# Patient Record
Sex: MALE | Race: WHITE | Employment: FULL TIME | URBAN - METROPOLITAN AREA
[De-identification: names, ages, dates, MRNs, and addresses within clinical notes are randomized per-mention and may not be internally consistent; named-entity substitution may affect disease eponyms.]

---

## 2019-07-02 ENCOUNTER — OFFICE VISIT (OUTPATIENT)
Dept: FAMILY MEDICINE CLINIC | Age: 54
End: 2019-07-02
Payer: COMMERCIAL

## 2019-07-02 VITALS
HEIGHT: 69 IN | BODY MASS INDEX: 42.51 KG/M2 | SYSTOLIC BLOOD PRESSURE: 134 MMHG | HEART RATE: 89 BPM | WEIGHT: 287 LBS | TEMPERATURE: 98.3 F | RESPIRATION RATE: 17 BRPM | DIASTOLIC BLOOD PRESSURE: 86 MMHG

## 2019-07-02 DIAGNOSIS — K21.9 GASTROESOPHAGEAL REFLUX DISEASE, ESOPHAGITIS PRESENCE NOT SPECIFIED: ICD-10-CM

## 2019-07-02 DIAGNOSIS — C43.9 MELANOMA OF SKIN (HCC): ICD-10-CM

## 2019-07-02 DIAGNOSIS — I10 ESSENTIAL HYPERTENSION: Primary | ICD-10-CM

## 2019-07-02 DIAGNOSIS — E78.2 MIXED HYPERLIPIDEMIA: ICD-10-CM

## 2019-07-02 PROCEDURE — 99203 OFFICE O/P NEW LOW 30 MIN: CPT | Performed by: NURSE PRACTITIONER

## 2019-07-02 RX ORDER — LOSARTAN POTASSIUM 50 MG/1
50 TABLET ORAL DAILY
COMMUNITY
End: 2019-07-02

## 2019-07-02 RX ORDER — LOSARTAN POTASSIUM 50 MG/1
50 TABLET ORAL DAILY
Qty: 30 TABLET | Refills: 3 | Status: CANCELLED | OUTPATIENT
Start: 2019-07-02

## 2019-07-02 RX ORDER — OMEPRAZOLE 40 MG/1
40 CAPSULE, DELAYED RELEASE ORAL DAILY
Qty: 30 CAPSULE | Refills: 5 | Status: SHIPPED | OUTPATIENT
Start: 2019-07-02 | End: 2020-01-07

## 2019-07-02 RX ORDER — SILDENAFIL CITRATE 20 MG/1
20 TABLET ORAL DAILY
Qty: 30 TABLET | Refills: 5 | Status: SHIPPED | OUTPATIENT
Start: 2019-07-02 | End: 2021-06-01 | Stop reason: ALTCHOICE

## 2019-07-02 RX ORDER — OMEPRAZOLE 40 MG/1
40 CAPSULE, DELAYED RELEASE ORAL DAILY
COMMUNITY
End: 2019-07-02 | Stop reason: SDUPTHER

## 2019-07-02 RX ORDER — BISOPROLOL FUMARATE AND HYDROCHLOROTHIAZIDE 5; 6.25 MG/1; MG/1
1 TABLET ORAL DAILY
Qty: 30 TABLET | Refills: 5 | Status: SHIPPED | OUTPATIENT
Start: 2019-07-02 | End: 2019-07-16 | Stop reason: SINTOL

## 2019-07-02 RX ORDER — SILDENAFIL CITRATE 20 MG/1
20 TABLET ORAL DAILY
COMMUNITY
End: 2019-07-02 | Stop reason: SDUPTHER

## 2019-07-02 ASSESSMENT — PATIENT HEALTH QUESTIONNAIRE - PHQ9
SUM OF ALL RESPONSES TO PHQ QUESTIONS 1-9: 0
SUM OF ALL RESPONSES TO PHQ QUESTIONS 1-9: 0
SUM OF ALL RESPONSES TO PHQ9 QUESTIONS 1 & 2: 0
2. FEELING DOWN, DEPRESSED OR HOPELESS: 0
1. LITTLE INTEREST OR PLEASURE IN DOING THINGS: 0

## 2019-07-09 ENCOUNTER — TELEPHONE (OUTPATIENT)
Dept: FAMILY MEDICINE CLINIC | Age: 54
End: 2019-07-09

## 2019-07-16 RX ORDER — HYDROCHLOROTHIAZIDE 12.5 MG/1
12.5 CAPSULE, GELATIN COATED ORAL EVERY MORNING
Qty: 30 CAPSULE | Refills: 5 | Status: SHIPPED | OUTPATIENT
Start: 2019-07-16 | End: 2019-12-18 | Stop reason: SDUPTHER

## 2019-07-19 ASSESSMENT — ENCOUNTER SYMPTOMS
BLURRED VISION: 0
SHORTNESS OF BREATH: 0
ORTHOPNEA: 0

## 2019-12-18 RX ORDER — HYDROCHLOROTHIAZIDE 12.5 MG/1
12.5 CAPSULE, GELATIN COATED ORAL EVERY MORNING
Qty: 30 CAPSULE | Refills: 5 | Status: SHIPPED | OUTPATIENT
Start: 2019-12-18 | End: 2020-01-07 | Stop reason: SDUPTHER

## 2020-01-07 ENCOUNTER — OFFICE VISIT (OUTPATIENT)
Dept: FAMILY MEDICINE CLINIC | Age: 55
End: 2020-01-07
Payer: COMMERCIAL

## 2020-01-07 VITALS
SYSTOLIC BLOOD PRESSURE: 132 MMHG | HEART RATE: 89 BPM | DIASTOLIC BLOOD PRESSURE: 88 MMHG | BODY MASS INDEX: 42.8 KG/M2 | RESPIRATION RATE: 17 BRPM | WEIGHT: 289 LBS | HEIGHT: 69 IN

## 2020-01-07 PROCEDURE — 99214 OFFICE O/P EST MOD 30 MIN: CPT | Performed by: NURSE PRACTITIONER

## 2020-01-07 RX ORDER — HYDROCHLOROTHIAZIDE 12.5 MG/1
12.5 CAPSULE, GELATIN COATED ORAL EVERY MORNING
Qty: 30 CAPSULE | Refills: 10 | Status: SHIPPED | OUTPATIENT
Start: 2020-01-07 | End: 2020-07-14 | Stop reason: SDUPTHER

## 2020-01-07 RX ORDER — SILDENAFIL 100 MG/1
50-100 TABLET, FILM COATED ORAL PRN
Qty: 30 TABLET | Refills: 1 | Status: SHIPPED | OUTPATIENT
Start: 2020-01-07 | End: 2020-05-12

## 2020-01-09 ASSESSMENT — ENCOUNTER SYMPTOMS
BLURRED VISION: 0
SHORTNESS OF BREATH: 0
ORTHOPNEA: 0

## 2020-01-09 NOTE — PROGRESS NOTES
 Food insecurity:     Worry: Not on file     Inability: Not on file    Transportation needs:     Medical: Not on file     Non-medical: Not on file   Tobacco Use    Smoking status: Never Smoker    Smokeless tobacco: Never Used   Substance and Sexual Activity    Alcohol use: Yes    Drug use: Yes     Types: Marijuana    Sexual activity: Not on file   Lifestyle    Physical activity:     Days per week: Not on file     Minutes per session: Not on file    Stress: Not on file   Relationships    Social connections:     Talks on phone: Not on file     Gets together: Not on file     Attends Anglican service: Not on file     Active member of club or organization: Not on file     Attends meetings of clubs or organizations: Not on file     Relationship status: Not on file    Intimate partner violence:     Fear of current or ex partner: Not on file     Emotionally abused: Not on file     Physically abused: Not on file     Forced sexual activity: Not on file   Other Topics Concern    Not on file   Social History Narrative    Not on file     No family history on file. No Known Allergies  Current Outpatient Medications   Medication Sig Dispense Refill    hydrochlorothiazide (MICROZIDE) 12.5 MG capsule Take 1 capsule by mouth every morning 30 capsule 10    sildenafil (VIAGRA) 100 MG tablet Take 0.5-1 tablets by mouth as needed for Erectile Dysfunction 30 tablet 1    sildenafil (REVATIO) 20 MG tablet Take 1 tablet by mouth daily 30 tablet 5    lovastatin (ALTOPREV) 20 MG extended release tablet Take 1 tablet by mouth nightly 30 tablet 5     No current facility-administered medications for this visit. PMH, Surgical Hx, Family Hx, and Social Hx reviewed and updated. Health Maintenance reviewed. Objective    Vitals:    01/07/20 1728   BP: 132/88   Pulse: 89   Resp: 17   Weight: 289 lb (131.1 kg)   Height: 5' 9\" (1.753 m)       Physical Exam  Constitutional:       General: He is not in acute distress.

## 2020-05-12 RX ORDER — SILDENAFIL 100 MG/1
TABLET, FILM COATED ORAL
Qty: 30 TABLET | Refills: 0 | Status: SHIPPED | OUTPATIENT
Start: 2020-05-12 | End: 2020-10-12

## 2020-05-19 ENCOUNTER — PATIENT MESSAGE (OUTPATIENT)
Dept: FAMILY MEDICINE CLINIC | Age: 55
End: 2020-05-19

## 2020-05-20 NOTE — TELEPHONE ENCOUNTER
From: Pao Mary  To: AMIRA Ulrich - CNP  Sent: 5/19/2020 8:51 PM EDT  Subject: Non-Urgent Medical Question    Reyna , This is Sarah Hernadez, I was wondering if you could send use a letter for our campground, at [employer], to get us a use of a gulf card, they need a letter from our doctor saying we a joint problems and muscle problems so we can be eligible, the email is [email or web address]- any questions please call Myna Goodell at [phone], thanks (2) assistive person

## 2020-07-09 ENCOUNTER — APPOINTMENT (OUTPATIENT)
Dept: CT IMAGING | Age: 55
End: 2020-07-09
Payer: COMMERCIAL

## 2020-07-09 ENCOUNTER — OFFICE VISIT (OUTPATIENT)
Dept: FAMILY MEDICINE CLINIC | Age: 55
End: 2020-07-09
Payer: COMMERCIAL

## 2020-07-09 ENCOUNTER — NURSE TRIAGE (OUTPATIENT)
Dept: OTHER | Facility: CLINIC | Age: 55
End: 2020-07-09

## 2020-07-09 ENCOUNTER — HOSPITAL ENCOUNTER (EMERGENCY)
Age: 55
Discharge: HOME | End: 2020-07-09
Attending: EMERGENCY MEDICINE
Payer: COMMERCIAL

## 2020-07-09 VITALS
RESPIRATION RATE: 18 BRPM | HEART RATE: 99 BPM | HEIGHT: 68 IN | SYSTOLIC BLOOD PRESSURE: 162 MMHG | OXYGEN SATURATION: 96 % | BODY MASS INDEX: 41.68 KG/M2 | TEMPERATURE: 99.7 F | DIASTOLIC BLOOD PRESSURE: 92 MMHG | WEIGHT: 275 LBS

## 2020-07-09 VITALS
WEIGHT: 289 LBS | TEMPERATURE: 97 F | OXYGEN SATURATION: 97 % | SYSTOLIC BLOOD PRESSURE: 138 MMHG | DIASTOLIC BLOOD PRESSURE: 80 MMHG | HEART RATE: 104 BPM | BODY MASS INDEX: 42.68 KG/M2

## 2020-07-09 LAB
ALBUMIN SERPL-MCNC: 4.6 G/DL (ref 3.5–4.6)
ALP BLD-CCNC: 72 U/L (ref 35–104)
ALT SERPL-CCNC: 21 U/L (ref 0–41)
AMYLASE: 49 U/L (ref 22–93)
ANION GAP SERPL CALCULATED.3IONS-SCNC: 12 MEQ/L (ref 9–15)
AST SERPL-CCNC: 22 U/L (ref 0–40)
BACTERIA: NEGATIVE /HPF
BASOPHILS ABSOLUTE: 0 K/UL (ref 0–0.2)
BASOPHILS RELATIVE PERCENT: 0.5 %
BILIRUB SERPL-MCNC: 1.2 MG/DL (ref 0.2–0.7)
BILIRUBIN URINE: NEGATIVE
BLOOD, URINE: ABNORMAL
BUN BLDV-MCNC: 13 MG/DL (ref 6–20)
CALCIUM SERPL-MCNC: 9.5 MG/DL (ref 8.5–9.9)
CHLORIDE BLD-SCNC: 97 MEQ/L (ref 95–107)
CLARITY: CLEAR
CO2: 22 MEQ/L (ref 20–31)
COLOR: ABNORMAL
CREAT SERPL-MCNC: 0.98 MG/DL (ref 0.7–1.2)
EOSINOPHILS ABSOLUTE: 0.1 K/UL (ref 0–0.7)
EOSINOPHILS RELATIVE PERCENT: 0.5 %
EPITHELIAL CELLS, UA: NORMAL /HPF (ref 0–5)
GFR AFRICAN AMERICAN: >60
GFR NON-AFRICAN AMERICAN: >60
GLOBULIN: 3.4 G/DL (ref 2.3–3.5)
GLUCOSE BLD-MCNC: 106 MG/DL (ref 70–99)
GLUCOSE URINE: NEGATIVE MG/DL
HCT VFR BLD CALC: 48.6 % (ref 42–52)
HEMOGLOBIN: 17 G/DL (ref 14–18)
HYALINE CASTS: NORMAL /HPF (ref 0–5)
KETONES, URINE: 40 MG/DL
LACTIC ACID: 1.3 MMOL/L (ref 0.5–2.2)
LEUKOCYTE ESTERASE, URINE: NEGATIVE
LIPASE: 33 U/L (ref 12–95)
LYMPHOCYTES ABSOLUTE: 0.9 K/UL (ref 1–4.8)
LYMPHOCYTES RELATIVE PERCENT: 8.3 %
MAGNESIUM: 2.4 MG/DL (ref 1.7–2.4)
MCH RBC QN AUTO: 30.5 PG (ref 27–31.3)
MCHC RBC AUTO-ENTMCNC: 35 % (ref 33–37)
MCV RBC AUTO: 87.3 FL (ref 80–100)
MONOCYTES ABSOLUTE: 1.4 K/UL (ref 0.2–0.8)
MONOCYTES RELATIVE PERCENT: 13.7 %
NEUTROPHILS ABSOLUTE: 8 K/UL (ref 1.4–6.5)
NEUTROPHILS RELATIVE PERCENT: 77 %
NITRITE, URINE: NEGATIVE
PDW BLD-RTO: 13.2 % (ref 11.5–14.5)
PH UA: 5 (ref 5–9)
PLATELET # BLD: 234 K/UL (ref 130–400)
POTASSIUM SERPL-SCNC: 3.4 MEQ/L (ref 3.4–4.9)
PROTEIN UA: ABNORMAL MG/DL
RBC # BLD: 5.57 M/UL (ref 4.7–6.1)
RBC UA: NORMAL /HPF (ref 0–5)
SARS-COV-2, NAAT: NOT DETECTED
SODIUM BLD-SCNC: 131 MEQ/L (ref 135–144)
SPECIFIC GRAVITY UA: 1.02 (ref 1–1.03)
TOTAL PROTEIN: 8 G/DL (ref 6.3–8)
URINE REFLEX TO CULTURE: ABNORMAL
UROBILINOGEN, URINE: 1 E.U./DL
WBC # BLD: 10.4 K/UL (ref 4.8–10.8)
WBC UA: NORMAL /HPF (ref 0–5)

## 2020-07-09 PROCEDURE — 6360000004 HC RX CONTRAST MEDICATION: Performed by: EMERGENCY MEDICINE

## 2020-07-09 PROCEDURE — 6360000002 HC RX W HCPCS: Performed by: EMERGENCY MEDICINE

## 2020-07-09 PROCEDURE — 96375 TX/PRO/DX INJ NEW DRUG ADDON: CPT

## 2020-07-09 PROCEDURE — 6370000000 HC RX 637 (ALT 250 FOR IP): Performed by: EMERGENCY MEDICINE

## 2020-07-09 PROCEDURE — 83735 ASSAY OF MAGNESIUM: CPT

## 2020-07-09 PROCEDURE — 83605 ASSAY OF LACTIC ACID: CPT

## 2020-07-09 PROCEDURE — 96374 THER/PROPH/DIAG INJ IV PUSH: CPT

## 2020-07-09 PROCEDURE — 99213 OFFICE O/P EST LOW 20 MIN: CPT | Performed by: NURSE PRACTITIONER

## 2020-07-09 PROCEDURE — 99284 EMERGENCY DEPT VISIT MOD MDM: CPT

## 2020-07-09 PROCEDURE — 93000 ELECTROCARDIOGRAM COMPLETE: CPT | Performed by: NURSE PRACTITIONER

## 2020-07-09 PROCEDURE — 83690 ASSAY OF LIPASE: CPT

## 2020-07-09 PROCEDURE — 74177 CT ABD & PELVIS W/CONTRAST: CPT

## 2020-07-09 PROCEDURE — 2580000003 HC RX 258: Performed by: EMERGENCY MEDICINE

## 2020-07-09 PROCEDURE — 85025 COMPLETE CBC W/AUTO DIFF WBC: CPT

## 2020-07-09 PROCEDURE — 80053 COMPREHEN METABOLIC PANEL: CPT

## 2020-07-09 PROCEDURE — U0002 COVID-19 LAB TEST NON-CDC: HCPCS

## 2020-07-09 PROCEDURE — 82150 ASSAY OF AMYLASE: CPT

## 2020-07-09 PROCEDURE — 36415 COLL VENOUS BLD VENIPUNCTURE: CPT

## 2020-07-09 PROCEDURE — 81001 URINALYSIS AUTO W/SCOPE: CPT

## 2020-07-09 RX ORDER — ONDANSETRON 2 MG/ML
4 INJECTION INTRAMUSCULAR; INTRAVENOUS ONCE
Status: COMPLETED | OUTPATIENT
Start: 2020-07-09 | End: 2020-07-09

## 2020-07-09 RX ORDER — 0.9 % SODIUM CHLORIDE 0.9 %
1000 INTRAVENOUS SOLUTION INTRAVENOUS ONCE
Status: COMPLETED | OUTPATIENT
Start: 2020-07-09 | End: 2020-07-09

## 2020-07-09 RX ORDER — MORPHINE SULFATE 2 MG/ML
4 INJECTION, SOLUTION INTRAMUSCULAR; INTRAVENOUS ONCE
Status: COMPLETED | OUTPATIENT
Start: 2020-07-09 | End: 2020-07-09

## 2020-07-09 RX ORDER — LOPERAMIDE HYDROCHLORIDE 2 MG/1
4 CAPSULE ORAL ONCE
Status: COMPLETED | OUTPATIENT
Start: 2020-07-09 | End: 2020-07-09

## 2020-07-09 RX ADMIN — MORPHINE SULFATE 4 MG: 2 INJECTION, SOLUTION INTRAMUSCULAR; INTRAVENOUS at 18:16

## 2020-07-09 RX ADMIN — SODIUM CHLORIDE 1000 ML: 9 INJECTION, SOLUTION INTRAVENOUS at 18:15

## 2020-07-09 RX ADMIN — ONDANSETRON 4 MG: 2 INJECTION INTRAMUSCULAR; INTRAVENOUS at 18:15

## 2020-07-09 RX ADMIN — IOPAMIDOL 100 ML: 612 INJECTION, SOLUTION INTRAVENOUS at 18:40

## 2020-07-09 RX ADMIN — LOPERAMIDE HYDROCHLORIDE 4 MG: 2 CAPSULE ORAL at 19:51

## 2020-07-09 ASSESSMENT — PAIN SCALES - GENERAL
PAINLEVEL_OUTOF10: 4
PAINLEVEL_OUTOF10: 4
PAINLEVEL_OUTOF10: 6

## 2020-07-09 ASSESSMENT — ENCOUNTER SYMPTOMS
VOMITING: 1
ABDOMINAL PAIN: 1
SHORTNESS OF BREATH: 0
NAUSEA: 1
CHEST TIGHTNESS: 0
EYE PAIN: 0
DIARRHEA: 1
SORE THROAT: 0

## 2020-07-09 NOTE — ED NOTES
Pt resting in bed with no signs of distress. He reports improvement since being medicated.      Columba Hendrickson, RN  07/09/20 1928

## 2020-07-09 NOTE — PROGRESS NOTES
Cardiovascular: Negative for chest pain. Gastrointestinal: Positive for abdominal pain, diarrhea, nausea and vomiting. Genitourinary: Negative for flank pain and urgency. Neurological: Positive for dizziness. Objective    Vitals:    07/09/20 1639   BP: 138/80   Site: Right Upper Arm   Position: Sitting   Cuff Size: Large Adult   Pulse: 104   Temp: 97 °F (36.1 °C)   TempSrc: Temporal   SpO2: 97%   Weight: 289 lb (131.1 kg)       Physical Exam  Constitutional:       Appearance: He is obese. He is ill-appearing and diaphoretic. HENT:      Head: Normocephalic and atraumatic. Right Ear: Hearing and external ear normal.      Left Ear: Hearing and external ear normal.   Cardiovascular:      Rate and Rhythm: Tachycardia present. Pulmonary:      Effort: Pulmonary effort is normal. No respiratory distress. Abdominal:      Tenderness: There is generalized abdominal tenderness. Skin:     General: Skin is moist.   Neurological:      Mental Status: He is alert. Assessment and Plan    Morris Sanchez was seen today for dizziness. Diagnoses and all orders for this visit:    Dizziness  -     EKG 12 Lead    Nausea    Diarrhea, unspecified type      Patient is ill appearing, diaphoretic, has not had anything to eat or drink in four days. Also with dizziness and abdominal pain. Advised patient that he should be seen in ED for STAT labs/imaging and IV fluids. Patient verbalized understanding. Patient's wife able to take patient to ED.       AMIRA Eller - CNP
Discharged

## 2020-07-09 NOTE — TELEPHONE ENCOUNTER
Reason for Disposition   Drinking very little and has signs of dehydration (e.g., no urine > 12 hours, very dry mouth, very lightheaded)    Answer Assessment - Initial Assessment Questions  1. DIARRHEA SEVERITY: \"How bad is the diarrhea? \" \"How many extra stools have you had in the past 24 hours than normal?\"     - NO DIARRHEA (SCALE 0)    - MILD (SCALE 1-3): Few loose or mushy BMs; increase of 1-3 stools over normal daily number of stools; mild increase in ostomy output. -  MODERATE (SCALE 4-7): Increase of 4-6 stools daily over normal; moderate increase in ostomy output. * SEVERE (SCALE 8-10; OR 'WORST POSSIBLE'): Increase of 7 or more stools daily over normal; moderate increase in ostomy output; incontinence. moderate  2. ONSET: \"When did the diarrhea begin? \"   4 days ago  3. BM CONSISTENCY: \"How loose or watery is the diarrhea? \"   watery  4. VOMITING: \"Are you also vomiting? \" If so, ask: \"How many times in the past 24 hours? \"    once yesterday  5. ABDOMINAL PAIN: Charlet [place] you having any abdominal pain? \" If yes: \"What does it feel like? \" (e.g., crampy, dull, intermittent, constant)      crampy  6. ABDOMINAL PAIN SEVERITY: If present, ask: \"How bad is the pain? \"  (e.g., Scale 1-10; mild, moderate, or severe)    - MILD (1-3): doesn't interfere with normal activities, abdomen soft and not tender to touch     - MODERATE (4-7): interferes with normal activities or awakens from sleep, tender to touch     - SEVERE (8-10): excruciating pain, doubled over, unable to do any normal activities    Pain=5-6  7. ORAL INTAKE: If vomiting, \"Have you been able to drink liquids? \" \"How much fluids have you had in the past 24 hours? \"  Not drinking fluids  8. HYDRATION: \"Any signs of dehydration? \" (e.g., dry mouth [not just dry lips], too weak to stand, dizziness, new weight loss) \"When did you last urinate? \"  Dry mouth, dizzy  9. EXPOSURE: \"Have you traveled to a foreign country recently? \" \"Have you been exposed to anyone

## 2020-07-09 NOTE — ED TRIAGE NOTES
Pt has not been able to eat or drink in 4 days. C/o abdominal pain, n/v/d. Having  Cold  Sweats.  Pt seen at pcp and sent to er for  Iv fluids

## 2020-07-09 NOTE — ED PROVIDER NOTES
[address] ED  eMERGENCY dEPARTMENTeNCOUnter      Pt Name: Indigo Mclaughlin  MRN: [id number]  [place] 1965  Date ofevaluation: 7/9/2020  Provider: Sherita Kong DO    CHIEF COMPLAINT       Chief Complaint   Patient presents with    Abdominal Pain     dizzy,  diarrhea, nausea,  pt has not eaten in 4 days and  has not had much to drink either, cold sweats         HISTORY OF PRESENT ILLNESS   (Location/Symptom, Timing/Onset,Context/Setting, Quality, Duration, Modifying Factors, Severity)  Note limiting factors. Indigo Mclaughlin is a 47 y.o. male who presents to the emergency department . Patient comes in with nausea watery diarrhea dizziness and intermittent sweats. This is been going on for 4 days. He has not eaten in 4 days. He was able to drink a little liquid today. He continues to get cold sweats. Did try some Pepto-Bismol. Vomited one time and one time only. Abdominal cramping which is generalized. Wife does not have the symptoms. Has not felt febrile. Goes camping every weekend with his wife. History of diverticulum but never had diverticulitis. History of hernia and gallbladder surgery. HPI    NursingNotes were reviewed. REVIEW OF SYSTEMS    (2-9 systems for level 4, 10 or more for level 5)     Review of Systems   Constitutional: Positive for appetite change and diaphoresis. Negative for activity change, fatigue and fever. HENT: Negative for congestion and sore throat. Eyes: Negative for pain and visual disturbance. Respiratory: Negative for chest tightness and shortness of breath. Cardiovascular: Negative for chest pain. Gastrointestinal: Positive for abdominal pain, diarrhea, nausea and vomiting. Endocrine: Negative for polydipsia. Genitourinary: Negative for flank pain and urgency. Musculoskeletal: Negative for gait problem and neck stiffness. Skin: Negative for rash. Neurological: Negative for weakness, light-headedness and headaches. Psychiatric/Behavioral: Negative for confusion and sleep disturbance. Except as noted above the remainder of the review of systems was reviewed and negative. PAST MEDICAL HISTORY     Past Medical History:   Diagnosis Date    Hyperlipidemia     Hypertension          SURGICALHISTORY     No past surgical history on file. CURRENT MEDICATIONS       Previous Medications    HYDROCHLOROTHIAZIDE (MICROZIDE) 12.5 MG CAPSULE    Take 1 capsule by mouth every morning    LOVASTATIN (ALTOPREV) 20 MG EXTENDED RELEASE TABLET    Take 1 tablet by mouth nightly    SILDENAFIL (REVATIO) 20 MG TABLET    Take 1 tablet by mouth daily    SILDENAFIL (VIAGRA) 100 MG TABLET    TAKE ONE-HALF TO ONE TABLET BY MOUTH AS NEEDED FOR ERECTILE DYSFUNCTION       ALLERGIES     Patient has no known allergies. FAMILY HISTORY     No family history on file. SOCIAL HISTORY       Social History     Socioeconomic History    Marital status:      Spouse name: Not on file    Number of children: Not on file    Years of education: Not on file    Highest education level: Not on file   Occupational History    Not on file   Social Needs    Financial resource strain: Not on file    Food insecurity     Worry: Not on file     Inability: Not on file    Transportation needs     Medical: Not on file     Non-medical: Not on file   Tobacco Use    Smoking status: Never Smoker    Smokeless tobacco: Never Used   Substance and Sexual Activity    Alcohol use:  Yes    Drug use: Yes     Types: Marijuana    Sexual activity: Not on file   Lifestyle    Physical activity     Days per week: Not on file     Minutes per session: Not on file    Stress: Not on file   Relationships    Social connections     Talks on phone: Not on file     Gets together: Not on file     Attends Hinduism service: Not on file     Active member of club or organization: Not on file     Attends meetings of clubs or organizations: Not on file     Relationship normal.         DIAGNOSTIC RESULTS     EKG: All EKG's are interpreted by the Emergency Department Physician who either signs or Co-signsthis chart in the absence of a cardiologist.        RADIOLOGY:   Lynda Richard such as CT, Ultrasound and MRI are read by the radiologist. Dilma Morrow radiographic images are visualized and preliminarily interpreted by the emergency physician with the below findings:    CT abdomen pelvis only shows some fluid-filled small bowel loops consistent with enteritis. No obstruction    Interpretation per the Radiologist below, if available at the time ofthis note:    CT ABDOMEN PELVIS W IV CONTRAST Additional Contrast? None    (Results Pending)         ED BEDSIDE ULTRASOUND:   Performed by ED Physician - none    LABS:  Labs Reviewed   COMPREHENSIVE METABOLIC PANEL - Abnormal; Notable for the following components:       Result Value    Sodium 131 (*)     Glucose 106 (*)     Total Bilirubin 1.2 (*)     All other components within normal limits   CBC WITH AUTO DIFFERENTIAL - Abnormal; Notable for the following components:    Neutrophils Absolute 8.0 (*)     Lymphocytes Absolute 0.9 (*)     Monocytes Absolute 1.4 (*)     All other components within normal limits   AMYLASE   LIPASE   LACTIC ACID, PLASMA   MAGNESIUM   URINE RT REFLEX TO CULTURE   POCT CREATININE - URINE       All other labs were within normal range or not returned as of this dictation. EMERGENCY DEPARTMENT COURSE and DIFFERENTIAL DIAGNOSIS/MDM:   Vitals:    Vitals:    07/09/20 1725   BP: (!) 163/103   Pulse: 112   Resp: 20   Temp: 99.7 °F (37.6 °C)   SpO2: 97%   Weight: 275 lb (124.7 kg)   Height: 5' 8\" (1.727 m)       Patient here with abdominal cramping and diarrhea. CAT scan only shows fluid-filled loops of small bowel consistent with enteritis. Labs normal.  COVID negative. Patient can go home on a diarrhea diet and take Imodium right ear.     MDM      REASSESSMENT          CRITICAL CARE TIME   Total Critical Care time was 0 minutes, excluding separatelyreportable procedures. There was a high probability ofclinically significant/life threatening deterioration in the patient's condition which required my urgent intervention. CONSULTS:  None    PROCEDURES:  Unless otherwise noted below, none     Procedures    FINAL IMPRESSION      1. Diarrhea, unspecified type          DISPOSITION/PLAN   DISPOSITION        PATIENT REFERREDTO:  Lizbeth Reagan, APRN - CNP  [id number] [place] [id number]  [phone]      As needed      DISCHARGEMEDICATIONS:  New Prescriptions    No medications on file     Controlled Substances Monitoring:     No flowsheet data found.     (Please note that portions of this note were completed with a voice recognition program.  Efforts were made to edit the dictations but occasionally words are mis-transcribed.)    Malu Larios DO (electronically signed)  Attending Emergency Physician         Malu Larios DO  07/09/20 2002

## 2020-07-10 NOTE — ED NOTES
Discharge instructions reviewed with pt and signed. Work note provided to pt. No questions at this time. Pt ambulatory in stable condition with wife at discharge.      Brianna Alfonso RN  07/09/20 2011

## 2020-07-14 ASSESSMENT — ENCOUNTER SYMPTOMS
SHORTNESS OF BREATH: 0
SORE THROAT: 0
VOMITING: 1
DIARRHEA: 1
CHEST TIGHTNESS: 0
ABDOMINAL PAIN: 1
NAUSEA: 1

## 2020-09-16 ENCOUNTER — NURSE ONLY (OUTPATIENT)
Dept: FAMILY MEDICINE CLINIC | Age: 55
End: 2020-09-16

## 2020-09-16 ENCOUNTER — OFFICE VISIT (OUTPATIENT)
Dept: FAMILY MEDICINE CLINIC | Age: 55
End: 2020-09-16
Payer: COMMERCIAL

## 2020-09-16 VITALS
OXYGEN SATURATION: 98 % | WEIGHT: 281 LBS | TEMPERATURE: 98.5 F | BODY MASS INDEX: 42.59 KG/M2 | DIASTOLIC BLOOD PRESSURE: 110 MMHG | RESPIRATION RATE: 14 BRPM | HEART RATE: 111 BPM | HEIGHT: 68 IN | SYSTOLIC BLOOD PRESSURE: 160 MMHG

## 2020-09-16 PROCEDURE — 99213 OFFICE O/P EST LOW 20 MIN: CPT | Performed by: INTERNAL MEDICINE

## 2020-09-16 RX ORDER — OMEPRAZOLE 40 MG/1
40 CAPSULE, DELAYED RELEASE ORAL DAILY
Qty: 30 CAPSULE | Refills: 3 | Status: SHIPPED | OUTPATIENT
Start: 2020-09-16 | End: 2021-01-11

## 2020-09-16 RX ORDER — LOPERAMIDE HYDROCHLORIDE 2 MG/1
2 CAPSULE ORAL 4 TIMES DAILY PRN
Qty: 56 CAPSULE | Refills: 0 | Status: SHIPPED | OUTPATIENT
Start: 2020-09-16 | End: 2020-09-30

## 2020-09-16 ASSESSMENT — ENCOUNTER SYMPTOMS
ABDOMINAL DISTENTION: 0
COLOR CHANGE: 0
EYE REDNESS: 0
BLOOD IN STOOL: 0
FACIAL SWELLING: 0
SHORTNESS OF BREATH: 0
COUGH: 0
VOMITING: 0
SINUS PRESSURE: 0
CONSTIPATION: 0
NAUSEA: 0
RECTAL PAIN: 0
SORE THROAT: 0
SINUS PAIN: 0
APNEA: 0
WHEEZING: 0
ABDOMINAL PAIN: 0
VOICE CHANGE: 0
RHINORRHEA: 0
TROUBLE SWALLOWING: 0
EYE PAIN: 0
CHEST TIGHTNESS: 0
EYE DISCHARGE: 0
PHOTOPHOBIA: 0
EYE ITCHING: 0
BACK PAIN: 0
DIARRHEA: 0

## 2020-09-16 NOTE — PROGRESS NOTES
2020    Clementina Florian (:  1965) is a 47 y.o. male, here for evaluation of the following medical concerns:Abdominal pain    HPI    Abdominal pain:  Began on ,   nausea and vomiting. Mid epigastric, 4/10, intermittent, lasting hours,   Night sweats,  Pt has not been taking prilosec. Denies fever  Improved with meal    + Sweats    Hypertension: Pt has been compliant with microzide. Review of Systems   Constitutional: Negative for chills, diaphoresis, fatigue and fever. HENT: Negative for congestion, dental problem, drooling, ear discharge, ear pain, facial swelling, hearing loss, mouth sores, nosebleeds, postnasal drip, rhinorrhea, sinus pressure, sinus pain, sneezing, sore throat, tinnitus, trouble swallowing and voice change. Eyes: Negative for photophobia, pain, discharge, redness, itching and visual disturbance. Respiratory: Negative for apnea, cough, chest tightness, shortness of breath and wheezing. Cardiovascular: Negative for chest pain, palpitations and leg swelling. Gastrointestinal: Negative for abdominal distention, abdominal pain, blood in stool, constipation, diarrhea, nausea, rectal pain and vomiting. Endocrine: Negative for cold intolerance, heat intolerance, polydipsia, polyphagia and polyuria. Genitourinary: Negative for decreased urine volume, difficulty urinating, dysuria, flank pain, frequency, genital sores, hematuria and urgency. Musculoskeletal: Negative for arthralgias, back pain, gait problem, joint swelling, myalgias, neck pain and neck stiffness. Skin: Negative for color change, rash and wound. Allergic/Immunologic: Negative for environmental allergies and food allergies. Neurological: Negative for dizziness, tremors, seizures, syncope, facial asymmetry, speech difficulty, weakness, light-headedness, numbness and headaches. Hematological: Negative for adenopathy. Does not bruise/bleed easily.    Psychiatric/Behavioral: Negative for agitation, confusion, decreased concentration, hallucinations, self-injury, sleep disturbance and suicidal ideas. The patient is not nervous/anxious. Prior to Visit Medications    Medication Sig Taking? Authorizing Provider   loperamide (RA ANTI-DIARRHEAL) 2 MG capsule Take 1 capsule by mouth 4 times daily as needed for Diarrhea Yes Vinay Kumari MD   omeprazole (PRILOSEC) 40 MG delayed release capsule Take 1 capsule by mouth daily Yes Vinay Kumari MD   hydroCHLOROthiazide (MICROZIDE) 12.5 MG capsule Take 1 capsule by mouth every morning Yes AMIRA Alex CNP   sildenafil (VIAGRA) 100 MG tablet TAKE ONE-HALF TO ONE TABLET BY MOUTH AS NEEDED FOR ERECTILE DYSFUNCTION Yes AMIRA Alex CNP   sildenafil (REVATIO) 20 MG tablet Take 1 tablet by mouth daily Yes AMIRA Rodriguez CNP   lovastatin (ALTOPREV) 20 MG extended release tablet Take 1 tablet by mouth nightly Yes AMIRA Alex CNP        No Known Allergies    Past Medical History:   Diagnosis Date    Hyperlipidemia     Hypertension        History reviewed. No pertinent surgical history. Social History     Socioeconomic History    Marital status:      Spouse name: Not on file    Number of children: Not on file    Years of education: Not on file    Highest education level: Not on file   Occupational History    Not on file   Social Needs    Financial resource strain: Not on file    Food insecurity     Worry: Not on file     Inability: Not on file    Transportation needs     Medical: Not on file     Non-medical: Not on file   Tobacco Use    Smoking status: Never Smoker    Smokeless tobacco: Never Used   Substance and Sexual Activity    Alcohol use:  Yes    Drug use: Yes     Types: Marijuana    Sexual activity: Not on file   Lifestyle    Physical activity     Days per week: Not on file     Minutes per session: Not on file    Stress: Not on file   Relationships    Social connections Talks on phone: Not on file     Gets together: Not on file     Attends Sabianism service: Not on file     Active member of club or organization: Not on file     Attends meetings of clubs or organizations: Not on file     Relationship status: Not on file    Intimate partner violence     Fear of current or ex partner: Not on file     Emotionally abused: Not on file     Physically abused: Not on file     Forced sexual activity: Not on file   Other Topics Concern    Not on file   Social History Narrative    Not on file        History reviewed. No pertinent family history. Vitals:    09/16/20 1304   BP: (!) 160/110   Pulse: 111   Resp: 14   Temp: 98.5 °F (36.9 °C)   SpO2: 98%   Weight: 281 lb (127.5 kg)   Height: 5' 8\" (1.727 m)     Estimated body mass index is 42.73 kg/m² as calculated from the following:    Height as of this encounter: 5' 8\" (1.727 m). Weight as of this encounter: 281 lb (127.5 kg). Physical Exam  Constitutional:       General: He is not in acute distress. Appearance: He is well-developed. HENT:      Head: Normocephalic. Right Ear: External ear normal.      Left Ear: External ear normal.   Eyes:      Conjunctiva/sclera: Conjunctivae normal.   Neck:      Musculoskeletal: Neck supple. Vascular: No JVD. Trachea: No tracheal deviation. Cardiovascular:      Rate and Rhythm: Normal rate and regular rhythm. Heart sounds: Normal heart sounds. Pulmonary:      Effort: Pulmonary effort is normal. No respiratory distress. Breath sounds: Normal breath sounds. No wheezing or rales. Chest:      Chest wall: No tenderness. Abdominal:      General: Bowel sounds are normal. There is no distension. Palpations: Abdomen is soft. There is no mass. Tenderness: There is no abdominal tenderness. There is no guarding or rebound. Musculoskeletal:         General: No tenderness or deformity. Lymphadenopathy:      Cervical: No cervical adenopathy.    Skin:     General: Skin is warm and dry. Coloration: Skin is not pale. Findings: No erythema or rash. Neurological:      Mental Status: He is alert and oriented to person, place, and time. Cranial Nerves: No cranial nerve deficit. Motor: No abnormal muscle tone. Psychiatric:         Thought Content: Thought content normal.         Judgment: Judgment normal.         ASSESSMENT/PLAN:  1. Diarrhea, unspecified type  -We will obtain COVID-19 serology. Requested that the patient self quarantine until the results of his COVID-19 have been provided.  -Loperamide. 2. Abdominal pain, unspecified abdominal location        No follow-ups on file. An  electronic signature was used to authenticate this note.     --Jesus Munoz MD on 9/16/2020 at 1:39 PM

## 2020-09-16 NOTE — LETTER
[facility]  [address] [id number]  Phone: [phone]  Fax: [phone]    Stan Patton MD        September 16, 2020     Patient: Tessie Carvajal   YOB: 1965   Date of Visit: 9/16/2020       To Whom It May Concern: It is my medical opinion that Rica Yoon may return to work on 09/21/20. If you have any questions or concerns, please don't hesitate to call.     Sincerely,        Stan Patton MD

## 2020-09-28 RX ORDER — HYDROCHLOROTHIAZIDE 12.5 MG/1
12.5 CAPSULE, GELATIN COATED ORAL EVERY MORNING
Qty: 30 CAPSULE | Refills: 2 | Status: SHIPPED | OUTPATIENT
Start: 2020-09-28 | End: 2021-01-03 | Stop reason: SDUPTHER

## 2020-10-12 RX ORDER — SILDENAFIL 100 MG/1
TABLET, FILM COATED ORAL
Qty: 30 TABLET | Refills: 0 | Status: SHIPPED | OUTPATIENT
Start: 2020-10-12 | End: 2021-01-14

## 2020-12-23 ENCOUNTER — HOSPITAL ENCOUNTER (EMERGENCY)
Age: 55
Discharge: HOME | End: 2020-12-23
Payer: COMMERCIAL

## 2020-12-23 VITALS
OXYGEN SATURATION: 95 % | RESPIRATION RATE: 18 BRPM | HEART RATE: 120 BPM | TEMPERATURE: 97.7 F | WEIGHT: 290 LBS | HEIGHT: 68 IN | SYSTOLIC BLOOD PRESSURE: 167 MMHG | DIASTOLIC BLOOD PRESSURE: 106 MMHG | BODY MASS INDEX: 43.95 KG/M2

## 2020-12-23 PROCEDURE — 99283 EMERGENCY DEPT VISIT LOW MDM: CPT

## 2020-12-23 RX ORDER — ONDANSETRON 4 MG/1
4 TABLET, ORALLY DISINTEGRATING ORAL EVERY 8 HOURS PRN
Qty: 12 TABLET | Refills: 0 | Status: SHIPPED | OUTPATIENT
Start: 2020-12-23 | End: 2021-03-17

## 2020-12-23 ASSESSMENT — ENCOUNTER SYMPTOMS
DIARRHEA: 1
NAUSEA: 1
COLOR CHANGE: 0
EYE DISCHARGE: 0
VOMITING: 1
RHINORRHEA: 0
ABDOMINAL DISTENTION: 0
ABDOMINAL PAIN: 0
SHORTNESS OF BREATH: 0
CONSTIPATION: 0
SORE THROAT: 0

## 2020-12-23 ASSESSMENT — PAIN DESCRIPTION - LOCATION: LOCATION: ABDOMEN

## 2020-12-23 ASSESSMENT — PAIN DESCRIPTION - DESCRIPTORS: DESCRIPTORS: CRAMPING

## 2020-12-23 ASSESSMENT — PAIN SCALES - GENERAL: PAINLEVEL_OUTOF10: 4

## 2020-12-23 ASSESSMENT — PAIN DESCRIPTION - PAIN TYPE: TYPE: ACUTE PAIN

## 2020-12-23 NOTE — ED PROVIDER NOTES
[address] [facility] ED  eMERGENCY dEPARTMENT eNCOUnter      Pt Name: Jamari Ross  MRN: [id number]  [place] 1965  Date of evaluation: 12/23/2020  Provider: Vee Victor PA-C    CHIEF COMPLAINT       Chief Complaint   Patient presents with    Abdominal Pain     abd pain since sunday. thinks he had food poisoning, nvd yesterday, none today          HISTORY OF PRESENT ILLNESS   (Location/Symptom, Timing/Onset,Context/Setting, Quality, Duration, Modifying Factors, Severity)  Note limiting factors. Jamari Ross is a 54 y.o. male who presents to the emergency department sent presents emerged part with complaint of nausea vomiting diarrhea which he states started on Sunday for him, he states he ate at [facility], and later that evening started having the symptoms. He believes this is related to food poisoning. He states that symptoms are fairly resolved at this point he had no recurrent nausea vomiting or diarrhea since yesterday. He has no fevers, no abdominal pain or cramping. Denies any pain at this time, 0-10. He is currently able to eat and drink without any recurrent episodes. Has medical history significant for hyperlipidemia, hypertension. HPI    NursingNotes were reviewed. REVIEW OF SYSTEMS    (2-9 systems for level 4, 10 or more for level 5)     Review of Systems   Constitutional: Negative for activity change and appetite change. HENT: Negative for congestion, ear discharge, ear pain, nosebleeds, rhinorrhea and sore throat. Eyes: Negative for discharge. Respiratory: Negative for shortness of breath. Cardiovascular: Negative for chest pain, palpitations and leg swelling. Gastrointestinal: Positive for diarrhea, nausea and vomiting. Negative for abdominal distention, abdominal pain and constipation. Genitourinary: Negative for difficulty urinating and dysuria. Musculoskeletal: Negative for arthralgias. Skin: Negative for color change. Neurological: Negative for dizziness, syncope, numbness and headaches. Psychiatric/Behavioral: Negative for agitation and confusion. Except as noted above the remainder of the review of systems was reviewed and negative. PAST MEDICAL HISTORY     Past Medical History:   Diagnosis Date    Hyperlipidemia     Hypertension          SURGICALHISTORY     History reviewed. No pertinent surgical history. CURRENT MEDICATIONS       Previous Medications    HYDROCHLOROTHIAZIDE (MICROZIDE) 12.5 MG CAPSULE    Take 1 capsule by mouth every morning    LOVASTATIN (ALTOPREV) 20 MG EXTENDED RELEASE TABLET    Take 1 tablet by mouth nightly    OMEPRAZOLE (PRILOSEC) 40 MG DELAYED RELEASE CAPSULE    Take 1 capsule by mouth daily    SILDENAFIL (REVATIO) 20 MG TABLET    Take 1 tablet by mouth daily    SILDENAFIL (VIAGRA) 100 MG TABLET    TAKE 1/2 TO 1 TABLET BY MOUTH AS NEEDED FOR ERECTILE DYSFUNCTION. ALLERGIES     Patient has no known allergies. FAMILY HISTORY     History reviewed. No pertinent family history.        SOCIAL HISTORY       Social History     Socioeconomic History    Marital status:      Spouse name: None    Number of children: None    Years of education: None    Highest education level: None   Occupational History    None   Social Needs    Financial resource strain: None    Food insecurity     Worry: None     Inability: None    Transportation needs     Medical: None     Non-medical: None   Tobacco Use    Smoking status: Never Smoker    Smokeless tobacco: Never Used   Substance and Sexual Activity    Alcohol use: Yes     Comment: occas    Drug use: Not Currently     Types: Marijuana    Sexual activity: None   Lifestyle    Physical activity     Days per week: None     Minutes per session: None    Stress: None   Relationships    Social connections     Talks on phone: None     Gets together: None     Attends [employer] service: None Active member of club or organization: None     Attends meetings of clubs or organizations: None     Relationship status: None    Intimate partner violence     Fear of current or ex partner: None     Emotionally abused: None     Physically abused: None     Forced sexual activity: None   Other Topics Concern    None   Social History Narrative    None       SCREENINGS      @FLOW([id number])@      PHYSICAL EXAM    (up to 7 for level 4, 8 or more for level 5)     ED Triage Vitals   BP Temp Temp Source Pulse Resp SpO2 Height Weight   12/23/20 0743 12/23/20 0741 12/23/20 0741 12/23/20 0741 12/23/20 0741 12/23/20 0741 12/23/20 0741 12/23/20 0741   (!) 167/106 97.7 °F (36.5 °C) Temporal 120 18 95 % 5' 8\" (1.727 m) 290 lb (131.5 kg)       Physical Exam  Vitals signs and nursing note reviewed. Constitutional:       General: He is not in acute distress. Appearance: He is well-developed. He is not ill-appearing, toxic-appearing or diaphoretic. Comments: Patient appears well, nontoxic appearance. HENT:      Head: Normocephalic. Nose: No congestion. Mouth/Throat:      Mouth: Mucous membranes are moist.      Pharynx: No oropharyngeal exudate or posterior oropharyngeal erythema. Eyes:      Extraocular Movements: Extraocular movements intact. Conjunctiva/sclera: Conjunctivae normal.      Pupils: Pupils are equal, round, and reactive to light. Neck:      Musculoskeletal: Normal range of motion and neck supple. No neck rigidity. Vascular: No JVD. Trachea: No tracheal deviation. Cardiovascular:      Rate and Rhythm: Normal rate. Pulses: Normal pulses. Heart sounds: Normal heart sounds. No murmur. No friction rub. No gallop. Pulmonary:      Effort: Pulmonary effort is normal. No tachypnea, accessory muscle usage, respiratory distress or retractions. Breath sounds: No stridor. No wheezing, rhonchi or rales. Chest:      Chest wall: No tenderness.    Abdominal: General: Abdomen is flat. Bowel sounds are normal. There is no distension or abdominal bruit. Palpations: There is no shifting dullness, fluid wave, hepatomegaly, splenomegaly, mass or pulsatile mass. Tenderness: There is no abdominal tenderness. There is no right CVA tenderness, left CVA tenderness, guarding or rebound. Negative signs include Oneill's sign, Rovsing's sign and McBurney's sign. Comments: Abdomen soft nondistended nontender no guarding mass or rebound, no CVA tenderness. Musculoskeletal:         General: No deformity. Skin:     General: Skin is warm and dry. Capillary Refill: Capillary refill takes less than 2 seconds. Coloration: Skin is not jaundiced. Neurological:      General: No focal deficit present. Mental Status: He is alert and oriented to person, place, and time. Mental status is at baseline. Cranial Nerves: No cranial nerve deficit. Sensory: No sensory deficit. Motor: No weakness. Coordination: Coordination normal.   Psychiatric:         Mood and Affect: Mood normal.         DIAGNOSTIC RESULTS     EKG: All EKG's are interpreted by the Emergency Department Physician who either signs or Co-signsthis chart in the absence of a cardiologist.        RADIOLOGY:   Joylene Hallmark such as CT, Ultrasound and MRI are read by the radiologist. Plain radiographic images are visualized and preliminarily interpreted by the emergency physician with the below findings:        Interpretation per the Radiologist below, if available at the time ofthis note:    No orders to display         ED BEDSIDE ULTRASOUND:   Performed by ED Physician - none    LABS:  Labs Reviewed - No data to display    All other labs were within normal range or not returned as of this dictation.     EMERGENCY DEPARTMENT COURSE and DIFFERENTIAL DIAGNOSIS/MDM:   Vitals:    Vitals:    12/23/20 0741 12/23/20 0743   BP:  (!) 167/106   Pulse: 120    Resp: 18 Temp: 97.7 °F (36.5 °C)    TempSrc: Temporal    SpO2: 95%    Weight: 290 lb (131.5 kg)    Height: 5' 8\" (1.727 m)           MDM  Number of Diagnoses or Management Options  Diarrhea, unspecified type  Non-intractable vomiting with nausea, unspecified vomiting type  Diagnosis management comments: Patient presents emerged from complaint of nausea vomiting diarrhea which he states started on Sunday for him, he states he had episodes Sunday Monday and Tuesday, and states that his symptoms are fairly resolved at this time. He states that he is concerned for food poisoning, but his symptoms are now resolved the time my evaluation. He states the only reason he came to the emerge department because his wife continues to bug him. And he also needed a work note as he is missed the last couple days of work. Patient was given a prescription for Zofran for home, and advised getting worsening or changes symptoms return to the emergency department. He is otherwise advised to contact and schedule follow-up appointment with his family physician next 2 to 3 days. CRITICAL CARE TIME   Total Critical Care time was 0 minutes, excluding separately reportableprocedures. There was a high probability of clinicallysignificant/life threatening deterioration in the patient's condition which required my urgent intervention. CONSULTS:  None    PROCEDURES:  Unless otherwise noted below, none     Procedures    FINAL IMPRESSION      1. Non-intractable vomiting with nausea, unspecified vomiting type    2. Diarrhea, unspecified type          DISPOSITION/PLAN   DISPOSITION Decision To Discharge 12/23/2020 08:15:23 AM      PATIENT REFERRED TO:  No follow-up provider specified.     DISCHARGE MEDICATIONS:  New Prescriptions    ONDANSETRON (ZOFRAN ODT) 4 MG DISINTEGRATING TABLET    Take 1 tablet by mouth every 8 hours as needed for Nausea (Please note that portions of this note were completed with a voice recognition program.  Efforts were made to edit the dictations but occasionally words are mis-transcribed.)    Anika Chino PA-C (electronically signed)  Attending Emergency Physician         Anika Chino PA-C  12/23/20 0888

## 2020-12-23 NOTE — ED TRIAGE NOTES
Pt c/o abd cramping since Sunday with emesis 2x and diarrhea that stopped yesterday. Pt believes that he had food poisoning. Pt rates abd cramping at 4/10 at this time. Pt states that he needs a rtw slip.

## 2021-01-03 DIAGNOSIS — I10 ESSENTIAL HYPERTENSION: ICD-10-CM

## 2021-01-07 RX ORDER — HYDROCHLOROTHIAZIDE 12.5 MG/1
12.5 CAPSULE, GELATIN COATED ORAL EVERY MORNING
Qty: 30 CAPSULE | Refills: 0 | Status: SHIPPED | OUTPATIENT
Start: 2021-01-07 | End: 2021-02-08

## 2021-01-14 RX ORDER — OMEPRAZOLE 40 MG/1
CAPSULE, DELAYED RELEASE ORAL
Qty: 30 CAPSULE | Refills: 3 | Status: SHIPPED | OUTPATIENT
Start: 2021-01-14 | End: 2021-06-01 | Stop reason: SDUPTHER

## 2021-01-14 RX ORDER — SILDENAFIL 100 MG/1
TABLET, FILM COATED ORAL
Qty: 30 TABLET | Refills: 0 | Status: SHIPPED | OUTPATIENT
Start: 2021-01-14 | End: 2021-06-01 | Stop reason: SDUPTHER

## 2021-02-08 DIAGNOSIS — I10 ESSENTIAL HYPERTENSION: ICD-10-CM

## 2021-02-08 RX ORDER — HYDROCHLOROTHIAZIDE 12.5 MG/1
12.5 CAPSULE, GELATIN COATED ORAL EVERY MORNING
Qty: 30 CAPSULE | Refills: 3 | Status: SHIPPED | OUTPATIENT
Start: 2021-02-08 | End: 2021-06-01 | Stop reason: SDUPTHER

## 2021-03-17 ENCOUNTER — OFFICE VISIT (OUTPATIENT)
Dept: FAMILY MEDICINE CLINIC | Age: 56
End: 2021-03-17
Payer: COMMERCIAL

## 2021-03-17 ENCOUNTER — HOSPITAL ENCOUNTER (EMERGENCY)
Age: 56
Discharge: HOME | End: 2021-03-17
Payer: COMMERCIAL

## 2021-03-17 ENCOUNTER — APPOINTMENT (OUTPATIENT)
Dept: CT IMAGING | Age: 56
End: 2021-03-17
Payer: COMMERCIAL

## 2021-03-17 VITALS
SYSTOLIC BLOOD PRESSURE: 170 MMHG | WEIGHT: 288.8 LBS | BODY MASS INDEX: 43.77 KG/M2 | HEART RATE: 110 BPM | HEIGHT: 68 IN | TEMPERATURE: 98.2 F | DIASTOLIC BLOOD PRESSURE: 110 MMHG | OXYGEN SATURATION: 98 %

## 2021-03-17 VITALS
SYSTOLIC BLOOD PRESSURE: 161 MMHG | HEART RATE: 80 BPM | TEMPERATURE: 97.5 F | WEIGHT: 275 LBS | HEIGHT: 68 IN | DIASTOLIC BLOOD PRESSURE: 98 MMHG | BODY MASS INDEX: 41.68 KG/M2 | OXYGEN SATURATION: 98 % | RESPIRATION RATE: 18 BRPM

## 2021-03-17 DIAGNOSIS — R11.0 NAUSEA: ICD-10-CM

## 2021-03-17 DIAGNOSIS — R63.8 DECREASED ORAL INTAKE: ICD-10-CM

## 2021-03-17 DIAGNOSIS — R10.9 ABDOMINAL PAIN, UNSPECIFIED ABDOMINAL LOCATION: Primary | ICD-10-CM

## 2021-03-17 DIAGNOSIS — E86.0 DEHYDRATION: ICD-10-CM

## 2021-03-17 DIAGNOSIS — I10 UNCONTROLLED HYPERTENSION: ICD-10-CM

## 2021-03-17 DIAGNOSIS — R61 DIAPHORESIS: ICD-10-CM

## 2021-03-17 DIAGNOSIS — K52.9 COLITIS: Primary | ICD-10-CM

## 2021-03-17 LAB
ALBUMIN SERPL-MCNC: 4.7 G/DL (ref 3.5–4.6)
ALP BLD-CCNC: 86 U/L (ref 35–104)
ALT SERPL-CCNC: 28 U/L (ref 0–41)
AMYLASE: 57 U/L (ref 22–93)
ANION GAP SERPL CALCULATED.3IONS-SCNC: 16 MEQ/L (ref 9–15)
AST SERPL-CCNC: 25 U/L (ref 0–40)
BACTERIA: NEGATIVE /HPF
BILIRUB SERPL-MCNC: 0.8 MG/DL (ref 0.2–0.7)
BILIRUBIN URINE: NEGATIVE
BLOOD, URINE: ABNORMAL
BUN BLDV-MCNC: 18 MG/DL (ref 6–20)
CALCIUM SERPL-MCNC: 9.8 MG/DL (ref 8.5–9.9)
CHLORIDE BLD-SCNC: 105 MEQ/L (ref 95–107)
CLARITY: CLEAR
CO2: 21 MEQ/L (ref 20–31)
COLOR: ABNORMAL
CREAT SERPL-MCNC: 1.03 MG/DL (ref 0.7–1.2)
EPITHELIAL CELLS, UA: ABNORMAL /HPF (ref 0–5)
GFR AFRICAN AMERICAN: >60
GFR NON-AFRICAN AMERICAN: >60
GLOBULIN: 3.5 G/DL (ref 2.3–3.5)
GLUCOSE BLD-MCNC: 88 MG/DL (ref 70–99)
GLUCOSE URINE: NEGATIVE MG/DL
HCT VFR BLD CALC: 52.5 % (ref 42–52)
HEMOGLOBIN: 18.2 G/DL (ref 14–18)
HYALINE CASTS: ABNORMAL /LPF (ref 0–5)
KETONES, URINE: >=80 MG/DL
LEUKOCYTE ESTERASE, URINE: NEGATIVE
LIPASE: 27 U/L (ref 12–95)
MCH RBC QN AUTO: 30 PG (ref 27–31.3)
MCHC RBC AUTO-ENTMCNC: 34.6 % (ref 33–37)
MCV RBC AUTO: 86.7 FL (ref 80–100)
NITRITE, URINE: NEGATIVE
PDW BLD-RTO: 13.4 % (ref 11.5–14.5)
PH UA: 5 (ref 5–9)
PLATELET # BLD: 278 K/UL (ref 130–400)
POTASSIUM SERPL-SCNC: 3.4 MEQ/L (ref 3.4–4.9)
PROTEIN UA: 30 MG/DL
RBC # BLD: 6.06 M/UL (ref 4.7–6.1)
RBC UA: ABNORMAL /HPF (ref 0–5)
SODIUM BLD-SCNC: 142 MEQ/L (ref 135–144)
SPECIFIC GRAVITY UA: 1.03 (ref 1–1.03)
TOTAL PROTEIN: 8.2 G/DL (ref 6.3–8)
URINE REFLEX TO CULTURE: ABNORMAL
UROBILINOGEN, URINE: 1 E.U./DL
WBC # BLD: 12.8 K/UL (ref 4.8–10.8)
WBC UA: ABNORMAL /HPF (ref 0–5)

## 2021-03-17 PROCEDURE — 6370000000 HC RX 637 (ALT 250 FOR IP): Performed by: PHYSICIAN ASSISTANT

## 2021-03-17 PROCEDURE — 96361 HYDRATE IV INFUSION ADD-ON: CPT

## 2021-03-17 PROCEDURE — 6360000004 HC RX CONTRAST MEDICATION: Performed by: PHYSICIAN ASSISTANT

## 2021-03-17 PROCEDURE — 83690 ASSAY OF LIPASE: CPT

## 2021-03-17 PROCEDURE — 85027 COMPLETE CBC AUTOMATED: CPT

## 2021-03-17 PROCEDURE — 96372 THER/PROPH/DIAG INJ SC/IM: CPT

## 2021-03-17 PROCEDURE — 36415 COLL VENOUS BLD VENIPUNCTURE: CPT

## 2021-03-17 PROCEDURE — 2580000003 HC RX 258: Performed by: PHYSICIAN ASSISTANT

## 2021-03-17 PROCEDURE — 99214 OFFICE O/P EST MOD 30 MIN: CPT | Performed by: FAMILY MEDICINE

## 2021-03-17 PROCEDURE — 6360000002 HC RX W HCPCS: Performed by: PHYSICIAN ASSISTANT

## 2021-03-17 PROCEDURE — 74177 CT ABD & PELVIS W/CONTRAST: CPT

## 2021-03-17 PROCEDURE — 80053 COMPREHEN METABOLIC PANEL: CPT

## 2021-03-17 PROCEDURE — 81001 URINALYSIS AUTO W/SCOPE: CPT

## 2021-03-17 PROCEDURE — 82150 ASSAY OF AMYLASE: CPT

## 2021-03-17 PROCEDURE — 96374 THER/PROPH/DIAG INJ IV PUSH: CPT

## 2021-03-17 PROCEDURE — 99284 EMERGENCY DEPT VISIT MOD MDM: CPT

## 2021-03-17 RX ORDER — 0.9 % SODIUM CHLORIDE 0.9 %
1000 INTRAVENOUS SOLUTION INTRAVENOUS ONCE
Status: COMPLETED | OUTPATIENT
Start: 2021-03-17 | End: 2021-03-17

## 2021-03-17 RX ORDER — SODIUM CHLORIDE 0.9 % (FLUSH) 0.9 %
10 SYRINGE (ML) INJECTION ONCE
Status: COMPLETED | OUTPATIENT
Start: 2021-03-17 | End: 2021-03-17

## 2021-03-17 RX ORDER — DICYCLOMINE HYDROCHLORIDE 10 MG/1
10 CAPSULE ORAL EVERY 6 HOURS PRN
Qty: 20 CAPSULE | Refills: 0 | Status: SHIPPED | OUTPATIENT
Start: 2021-03-17 | End: 2021-06-01 | Stop reason: ALTCHOICE

## 2021-03-17 RX ORDER — ONDANSETRON 4 MG/1
4 TABLET, ORALLY DISINTEGRATING ORAL EVERY 8 HOURS PRN
Qty: 20 TABLET | Refills: 0 | Status: SHIPPED | OUTPATIENT
Start: 2021-03-17 | End: 2021-06-01 | Stop reason: CLARIF

## 2021-03-17 RX ORDER — ONDANSETRON 4 MG/1
4 TABLET, FILM COATED ORAL EVERY 12 HOURS PRN
Qty: 30 TABLET | Refills: 0 | Status: SHIPPED | OUTPATIENT
Start: 2021-03-17 | End: 2021-06-01 | Stop reason: CLARIF

## 2021-03-17 RX ORDER — ONDANSETRON 2 MG/ML
4 INJECTION INTRAMUSCULAR; INTRAVENOUS ONCE
Status: COMPLETED | OUTPATIENT
Start: 2021-03-17 | End: 2021-03-17

## 2021-03-17 RX ORDER — DICYCLOMINE HYDROCHLORIDE 10 MG/ML
20 INJECTION INTRAMUSCULAR ONCE
Status: COMPLETED | OUTPATIENT
Start: 2021-03-17 | End: 2021-03-17

## 2021-03-17 RX ORDER — METRONIDAZOLE 500 MG/1
500 TABLET ORAL 3 TIMES DAILY
Qty: 30 TABLET | Refills: 0 | Status: SHIPPED | OUTPATIENT
Start: 2021-03-17 | End: 2021-03-27

## 2021-03-17 RX ORDER — CIPROFLOXACIN 500 MG/1
500 TABLET, FILM COATED ORAL 2 TIMES DAILY
Qty: 20 TABLET | Refills: 0 | Status: SHIPPED | OUTPATIENT
Start: 2021-03-17 | End: 2021-03-27

## 2021-03-17 RX ORDER — CIPROFLOXACIN 500 MG/1
500 TABLET, FILM COATED ORAL ONCE
Status: COMPLETED | OUTPATIENT
Start: 2021-03-17 | End: 2021-03-17

## 2021-03-17 RX ORDER — METRONIDAZOLE 250 MG/1
500 TABLET ORAL ONCE
Status: COMPLETED | OUTPATIENT
Start: 2021-03-17 | End: 2021-03-17

## 2021-03-17 RX ADMIN — METRONIDAZOLE 500 MG: 250 TABLET, FILM COATED ORAL at 16:29

## 2021-03-17 RX ADMIN — DICYCLOMINE HYDROCHLORIDE 20 MG: 20 INJECTION INTRAMUSCULAR at 15:51

## 2021-03-17 RX ADMIN — CIPROFLOXACIN 500 MG: 500 TABLET, FILM COATED ORAL at 16:29

## 2021-03-17 RX ADMIN — ONDANSETRON 4 MG: 2 INJECTION INTRAMUSCULAR; INTRAVENOUS at 15:47

## 2021-03-17 RX ADMIN — SODIUM CHLORIDE 1000 ML: 9 INJECTION, SOLUTION INTRAVENOUS at 15:47

## 2021-03-17 RX ADMIN — SODIUM CHLORIDE, PRESERVATIVE FREE 10 ML: 5 INJECTION INTRAVENOUS at 15:51

## 2021-03-17 RX ADMIN — IOPAMIDOL 100 ML: 612 INJECTION, SOLUTION INTRAVENOUS at 15:33

## 2021-03-17 SDOH — ECONOMIC STABILITY: FOOD INSECURITY: WITHIN THE PAST 12 MONTHS, THE FOOD YOU BOUGHT JUST DIDN'T LAST AND YOU DIDN'T HAVE MONEY TO GET MORE.: NEVER TRUE

## 2021-03-17 SDOH — ECONOMIC STABILITY: TRANSPORTATION INSECURITY
IN THE PAST 12 MONTHS, HAS LACK OF TRANSPORTATION KEPT YOU FROM MEETINGS, WORK, OR FROM GETTING THINGS NEEDED FOR DAILY LIVING?: NO

## 2021-03-17 ASSESSMENT — PAIN DESCRIPTION - DESCRIPTORS: DESCRIPTORS: DISCOMFORT

## 2021-03-17 ASSESSMENT — ENCOUNTER SYMPTOMS
ALLERGIC/IMMUNOLOGIC NEGATIVE: 1
TROUBLE SWALLOWING: 0
EYE PAIN: 0
COLOR CHANGE: 0
VOMITING: 0
SHORTNESS OF BREATH: 0
NAUSEA: 1
APNEA: 0
ABDOMINAL PAIN: 1
DIARRHEA: 1

## 2021-03-17 ASSESSMENT — PATIENT HEALTH QUESTIONNAIRE - PHQ9: SUM OF ALL RESPONSES TO PHQ QUESTIONS 1-9: 0

## 2021-03-17 NOTE — PROGRESS NOTES
Chief Complaint   Patient presents with    Abdominal Pain     States his stomach has been huring since Sunday. States he has not ate or drank anything since Sud[place]. Denies any fevers. Has been sweating, then is cold. Has dirrhea. Denies any vomiting or nasuea. Had a headache yesterday, does not today. Denies any cough. States he did take something OTC yesterday for an upset stomach, but does not know what it was. States it did not help. HPI: Barbara Martinez 54 y.o. male presenting for    Abdominal pain   Has been going on for several of days   Associated with sweats and chills   Has diarrhea with distention of the abdomen   It is worsening   Patient denies having the COVID vaccine or being tested for COVID recently. Patient has not eaten since Sunday. (no fluids or food)    Current Outpatient Medications   Medication Sig Dispense Refill    hydroCHLOROthiazide (MICROZIDE) 12.5 MG capsule take 1 capsule by mouth every morning 30 capsule 3    omeprazole (PRILOSEC) 40 MG delayed release capsule take 1 capsule by mouth once daily 30 capsule 3    sildenafil (VIAGRA) 100 MG tablet TAKE 1/2 TO 1 TABLET BY  MOUTH AS NEEDED FOR ERECTILE DYSFUNCTION. 30 tablet 0    sildenafil (REVATIO) 20 MG tablet Take 1 tablet by mouth daily 30 tablet 5    lovastatin (ALTOPREV) 20 MG extended release tablet Take 1 tablet by mouth nightly (Patient not taking: Reported on 3/17/2021) 30 tablet 5     No current facility-administered medications for this visit. ROS  CONSTITUTIONAL: The patient denies fevers, chills, sweats and body ache. HEENT: Denies headache, blurry vision, eye pain, tinnitus, vertigo,  sore throat, neck or thyroid masses. RESPIRATORY: Denies cough, sputum, hemoptysis. CARDIAC: Denies chest pain, pressure, palpitations, Denies lower extremity edema.   GASTROINTESTINAL:  Abdominal discomfort that is worsening , denies constipation, admits to diarrhea, denies  bleeding in the stools,   GENITOURINARY: Denies dysuria, hematuria, nocturia or frequency, urinary incontinence. NEUROLOGIC: Denies headaches, dizziness, syncope, weakness  MUSCULOSKELETAL: denies changes in range of motion, joint pain, stiffness. ENDOCRINOLOGY: Denies heat or cold intolerance. HEMATOLOGY: Denies easy bleeding or blood transfusion,anemia  DERMATOLOGY: Denies changes in moles or pigmentation changes. PSYCHIATRY: Denies depression, agitation or anxiety. Past Medical History:   Diagnosis Date    Hyperlipidemia     Hypertension         History reviewed. No pertinent surgical history. History reviewed. No pertinent family history.      Social History     Socioeconomic History    Marital status:      Spouse name: Not on file    Number of children: Not on file    Years of education: Not on file    Highest education level: Not on file   Occupational History    Not on file   Social Needs    Financial resource strain: Not hard at all    Food insecurity     Worry: Never true     Inability: Never true   [employer] needs     Medical: No     Non-medical: No   Tobacco Use    Smoking status: Never Smoker    Smokeless tobacco: Never Used   Substance and Sexual Activity    Alcohol use: Yes     Comment: occas    Drug use: Not Currently     Types: Marijuana    Sexual activity: Not on file   Lifestyle    Physical activity     Days per week: Not on file     Minutes per session: Not on file    Stress: Not on file   Relationships    Social connections     Talks on phone: Not on file     Gets together: Not on file     Attends Restorationist service: Not on file     Active member of club or organization: Not on file     Attends meetings of clubs or organizations: Not on file     Relationship status: Not on file    Intimate partner violence     Fear of current or ex partner: Not on file     Emotionally abused: Not on file     Physically abused: Not on file     Forced sexual activity: Not on file   Other Topics Concern    Not on file   Social History Narrative    Not on file        BP (!) 170/110   Pulse 110   Temp 98.2 °F (36.8 °C)   Ht 5' 8\" (1.727 m)   Wt 288 lb 12.8 oz (131 kg)   SpO2 98%   BMI 43.91 kg/m²        Physical Exam:    General appearance - alert, well appearing,  in discomfort, diarphoretic  Mental Status - alert, oriented to person, place, and time  Eyes - pupils equal and reactive, extraocular eye movements intact   Ears - bilateral TM's and external ear canals normal   Nose - normal and patent, no erythema, discharge or polyps   Sinuses - Normal paranasal sinuses without tenderness   Throat - mucous membranes moist, pharynx normal without lesions   Neck - supple, no significant adenopathy   Thyroid - thyroid is normal in size without nodules or tenderness    Chest - clear to auscultation, no wheezes, rales or rhonchi, symmetric air entry   Heart -  tachycardic, regular rhythm, normal S1, S2, no murmurs, rubs, clicks or gallops  Abdomen - obese abdomen with discomfort int he epigastric region. Low to none bowel sounds.    Back exam - full range of motion, no tenderness, palpable spasm or pain on motion   Neurological - alert, oriented, normal speech, no focal findings or movement disorder noted   Musculoskeletal - no joint tenderness, deformity or swelling   Extremities - peripheral pulses normal, no pedal edema, no clubbing or cyanosis   Skin - normal coloration and turgor, no rashes, no suspicious skin lesions noted      Labs   No results found for: TSHREFLEX  No results found for: TSH    Lab Results   Component Value Date     (L) 07/09/2020    K 3.4 07/09/2020    CL 97 07/09/2020    CO2 22 07/09/2020    BUN 13 07/09/2020    CREATININE 0.98 07/09/2020    GLUCOSE 106 (H) 07/09/2020    CALCIUM 9.5 07/09/2020    PROT 8.0 07/09/2020    LABALBU 4.6 07/09/2020    BILITOT 1.2 (H) 07/09/2020    ALKPHOS 72 07/09/2020    AST 22 07/09/2020    ALT 21 07/09/2020    LABGLOM >60.0 07/09/2020    GFRAA >60.0 07/09/2020 GLOB 3.4 07/09/2020       Lab Results   Component Value Date    WBC 10.4 07/09/2020    HGB 17.0 07/09/2020    HCT 48.6 07/09/2020    MCV 87.3 07/09/2020     07/09/2020     No results found for: LABA1C  No results found for: EAG        A/P: Gerard Bojorquez 54 y.o. male presenting for     1. Abdominal pain, unspecified abdominal location  Patient with abdominal discomfort and abdominal tenderness to palpation on examination. Associated with diaphoresis, nausea, and diarrhea. Need to rule out any Covid in the patient and will likely need a CT scan of the abdomen with contrast for further evaluation of his abdominal pain (rule out any obstruction). Patient has elevated blood pressures and to have this all done it is reasonable to have patient go to the emergency department for further evaluation and for lab work. 2. Diaphoresis      3. Decreased oral intake  Patient has not eaten or drink anything since Sunday. Likely to have fluids and a work-up for his abdominal discomfort and diarrhea. 4. Uncontrolled hypertension  Uncontrolled at baseline. But now associated with tachycardia. However given his symptoms may be elevated secondary to his abdominal discomfort. Please note, this report has been partially produced using speech recognition software  and may cause  and /or contain errors related to that system including grammar, punctuation and spelling as well as words and phrases that may seem inappropriate. If there are questions or concerns please feel free to contact me to clarify.

## 2021-03-17 NOTE — LETTER
[facility]  [address] [address]  Phone: [phone]  Fax: [phone]    Clifford Willis MD        March 17, 2021     Patient: Barbara Martinez   YOB: 1965   Date of Visit: 3/17/2021       To Whom it May Concern:    Narciso Cortés was seen in my clinic on 3/17/2021 for a medical condition. He may return to work on 3/22/21. If you have any questions or concerns, please don't hesitate to call.     Sincerely,           Clifford Willis MD

## 2021-03-17 NOTE — ED NOTES
Discharge education reviewed verbally and in writing. Fluids are still infusing at this time. Instructed to follow up with PCP and come back to the ED with any new or worsening symptoms. No questions or concerns at this time. No s/s of distress noted at this time.         Allyson Estrella RN  03/17/21 0138

## 2021-03-17 NOTE — ED PROVIDER NOTES
[address] [facility]  EMERGENCY DEPARTMENT ENCOUNTER      Pt Name: Tanya Cuba  MRN: [id number]  [place] 1965  Date of evaluation: 3/17/2021  Provider: Roman Nguyễn PA-C    CHIEF COMPLAINT       Chief Complaint   Patient presents with    Abdominal Pain     abd pain since sunday with diarrhea and nausea. HISTORY OF PRESENT ILLNESS   (Location/Symptom, Timing/Onset, Context/Setting, Quality, Duration, Modifying Factors, Severity)  Note limiting factors. Tanya Cuba is a 54 y.o. male who presents to the emergency department with complaints of right-sided abdominal pain and discomfort that began on Sunday and persisted over the past 3 days. Patient states poor appetite and nausea but denies any vomiting. Patient states he tried to make himself vomit to improve his symptoms without improvement. Patient does state that he has had diarrhea over the past 2 to 3 days. Patient denies any chest pain, palpitations or shortness of breath. Patient denies any headaches or dizziness. Patient states hot and cold and chills but denies any documented fever        HPI    Nursing Notes were reviewed. REVIEW OF SYSTEMS    (2-9 systems for level 4, 10 or more for level 5)     Review of Systems   Constitutional: Positive for chills. Negative for diaphoresis. HENT: Negative for hearing loss and trouble swallowing. Eyes: Negative for pain. Respiratory: Negative for apnea and shortness of breath. Cardiovascular: Negative for chest pain. Gastrointestinal: Positive for abdominal pain, diarrhea and nausea. Negative for vomiting. Endocrine: Negative. Genitourinary: Negative for hematuria. Musculoskeletal: Negative for neck pain and neck stiffness. Skin: Negative for color change. Allergic/Immunologic: Negative. Neurological: Negative for dizziness and numbness. Hematological: Negative. Psychiatric/Behavioral: Negative. All other systems reviewed and are negative. None     Gets together: None     Attends Hindu service: None     Active member of club or organization: None     Attends meetings of clubs or organizations: None     Relationship status: None    Intimate partner violence     Fear of current or ex partner: None     Emotionally abused: None     Physically abused: None     Forced sexual activity: None   Other Topics Concern    None   Social History Narrative    None       SCREENINGS        [place]illa Coma Scale  Eye Opening: Spontaneous  Best Verbal Response: Oriented  Best Motor Response: Obeys commands  [place] Coma Scale Score: 15               PHYSICAL EXAM    (up to 7 for level 4, 8 or more for level 5)     ED Triage Vitals [03/17/21 1246]   BP Temp Temp Source Pulse Resp SpO2 Height Weight   (!) 176/135 97.5 °F (36.4 °C) Temporal 110 17 95 % 5' 8\" (1.727 m) 275 lb (124.7 kg)       Physical Exam  Vitals signs and nursing note reviewed. Constitutional:       General: He is not in acute distress. Appearance: He is well-developed. He is not diaphoretic. HENT:      Head: Normocephalic and atraumatic. Mouth/Throat:      Pharynx: No oropharyngeal exudate. Eyes:      General: No scleral icterus. Conjunctiva/sclera: Conjunctivae normal.      Pupils: Pupils are equal, round, and reactive to light. Neck:      Musculoskeletal: Normal range of motion and neck supple. Trachea: No tracheal deviation. Cardiovascular:      Rate and Rhythm: Normal rate. Heart sounds: Normal heart sounds. Pulmonary:      Effort: Pulmonary effort is normal. No respiratory distress. Breath sounds: Normal breath sounds. Abdominal:      General: Bowel sounds are normal. There is no distension. Palpations: Abdomen is soft. Musculoskeletal: Normal range of motion. Skin:     General: Skin is warm and dry. Findings: No erythema or rash. Neurological:      Mental Status: He is alert and oriented to person, place, and time. Cranial Nerves:  No cranial nerve deficit. Motor: No abnormal muscle tone. Psychiatric:         Behavior: Behavior normal.         Thought Content: Thought content normal.         Judgment: Judgment normal.         DIAGNOSTIC RESULTS     EKG: All EKG's are interpreted by the Emergency Department Physician who either signs or Co-signs this chart in the absence of a cardiologist.        RADIOLOGY:   Non-plain film images such as CT, Ultrasound and MRI are read by the radiologist. Plain radiographic images are visualized and preliminarily interpreted by the emergency physician with the below findings:      Interpretation per the Radiologist below, if available at the time of this note:    CT ABDOMEN PELVIS W IV CONTRAST Additional Contrast? None   Final Result    IMPRESSION: MILD RIGHT-SIDED NONSPECIFIC COLITIS. CT ABDOMEN AND PELVIS OTHERWISE UNREMARKABLE AND UNCHANGED. Briseyda Libman ED BEDSIDE ULTRASOUND:   Performed by ED Physician - none    LABS:  Labs Reviewed   CBC - Abnormal; Notable for the following components:       Result Value    WBC 12.8 (*)     Hemoglobin 18.2 (*)     Hematocrit 52.5 (*)     All other components within normal limits   COMPREHENSIVE METABOLIC PANEL - Abnormal; Notable for the following components:    Anion Gap 16 (*)     Total Protein 8.2 (*)     Albumin 4.7 (*)     Total Bilirubin 0.8 (*)     All other components within normal limits   URINE RT REFLEX TO CULTURE - Abnormal; Notable for the following components:    Color, UA DARK YELLOW (*)     Ketones, Urine >=80 (*)     Blood, Urine TRACE (*)     Protein, UA 30 (*)     All other components within normal limits   MICROSCOPIC URINALYSIS - Abnormal; Notable for the following components:    Hyaline Casts, UA 10-20 (*)     WBC, UA 6-9 (*)     RBC, UA 3-5 (*)     All other components within normal limits   AMYLASE   LIPASE       All other labs were within normal range or not returned as of this dictation.     EMERGENCY DEPARTMENT COURSE and DIFFERENTIAL DIAGNOSIS/MDM:   Vitals:    Vitals:    03/17/21 1246 03/17/21 1545   BP: (!) 176/135 (!) 155/99   Pulse: 110 87   Resp: 17 18   Temp: 97.5 °F (36.4 °C)    TempSrc: Temporal    SpO2: 95% 97%   Weight: 275 lb (124.7 kg)    Height: 5' 8\" (1.727 m)            MDM      REASSESSMENT              CONSULTS:  None    PROCEDURES:  Unless otherwise noted below, none     Procedures    Patient presented the emergency department with right-sided abdominal pain with associated nausea and diarrhea. CT scan shows a nonspecific colitis but no free air or abscess. Patient had improved symptoms after IV meds and fluids. He will be discharged home with Cipro, Flagyl, antiemetics and antispasmodics. Follow-up with PCP    FINAL IMPRESSION      1. Colitis    2. Dehydration          DISPOSITION/PLAN   DISPOSITION Discharge - Pending Orders Complete 03/17/2021 04:29:15 PM      PATIENT REFERRED TO:  Signe Burkitt, APRN - [address]  [phone]    Call in 3 days        DISCHARGE MEDICATIONS:  New Prescriptions    CIPROFLOXACIN (CIPRO) 500 MG TABLET    Take 1 tablet by mouth 2 times daily for 10 days    DICYCLOMINE (BENTYL) 10 MG CAPSULE    Take 1 capsule by mouth every 6 hours as needed (cramps)    METRONIDAZOLE (FLAGYL) 500 MG TABLET    Take 1 tablet by mouth 3 times daily for 10 days    ONDANSETRON (ZOFRAN ODT) 4 MG DISINTEGRATING TABLET    Take 1 tablet by mouth every 8 hours as needed for Nausea     Controlled Substances Monitoring:     No flowsheet data found.     (Please note that portions of this note were completed with a voice recognition program.  Efforts were made to edit the dictations but occasionally words are mis-transcribed.)    Slime Pittman PA-C (electronically signed)  Attending Emergency Physician            Slime Pittman PA-C  03/17/21 7007

## 2021-03-17 NOTE — ED NOTES
Pt awake and alert. No s/s of distress noted at this time. No concerns or needs at this time. Will continue to monitor.         Shantal Dey RN  03/17/21 6941

## 2021-03-17 NOTE — ED TRIAGE NOTES
Pt a/o x 3 skin pink w/d resp non labored. Pt c/o mid abd pain since Sunday after eating pulled pork. Pt c/o diarrhea. pink in nad. . bs present.

## 2021-03-31 ENCOUNTER — TELEPHONE (OUTPATIENT)
Dept: FAMILY MEDICINE CLINIC | Age: 56
End: 2021-03-31

## 2021-04-29 ENCOUNTER — HOSPITAL ENCOUNTER (EMERGENCY)
Age: 56
Discharge: HOME | End: 2021-04-29
Payer: COMMERCIAL

## 2021-04-29 VITALS
RESPIRATION RATE: 20 BRPM | DIASTOLIC BLOOD PRESSURE: 100 MMHG | BODY MASS INDEX: 41.68 KG/M2 | SYSTOLIC BLOOD PRESSURE: 164 MMHG | OXYGEN SATURATION: 97 % | WEIGHT: 275 LBS | TEMPERATURE: 98.7 F | HEIGHT: 68 IN | HEART RATE: 96 BPM

## 2021-04-29 DIAGNOSIS — R10.84 GENERALIZED ABDOMINAL PAIN: ICD-10-CM

## 2021-04-29 DIAGNOSIS — Z87.19 HX OF DIVERTICULITIS OF COLON: ICD-10-CM

## 2021-04-29 DIAGNOSIS — R19.7 DIARRHEA, UNSPECIFIED TYPE: Primary | ICD-10-CM

## 2021-04-29 LAB
ALBUMIN SERPL-MCNC: 4.7 G/DL (ref 3.5–4.6)
ALP BLD-CCNC: 73 U/L (ref 35–104)
ALT SERPL-CCNC: 22 U/L (ref 0–41)
ANION GAP SERPL CALCULATED.3IONS-SCNC: 16 MEQ/L (ref 9–15)
AST SERPL-CCNC: 26 U/L (ref 0–40)
BASOPHILS ABSOLUTE: 0.1 K/UL (ref 0–0.2)
BASOPHILS RELATIVE PERCENT: 0.4 %
BILIRUB SERPL-MCNC: 0.6 MG/DL (ref 0.2–0.7)
BUN BLDV-MCNC: 19 MG/DL (ref 6–20)
CALCIUM SERPL-MCNC: 9.5 MG/DL (ref 8.5–9.9)
CHLORIDE BLD-SCNC: 104 MEQ/L (ref 95–107)
CO2: 21 MEQ/L (ref 20–31)
CREAT SERPL-MCNC: 1.12 MG/DL (ref 0.7–1.2)
EOSINOPHILS ABSOLUTE: 0.2 K/UL (ref 0–0.7)
EOSINOPHILS RELATIVE PERCENT: 1.8 %
GFR AFRICAN AMERICAN: >60
GFR NON-AFRICAN AMERICAN: >60
GLOBULIN: 2.9 G/DL (ref 2.3–3.5)
GLUCOSE BLD-MCNC: 89 MG/DL (ref 70–99)
HCT VFR BLD CALC: 48.6 % (ref 42–52)
HEMOGLOBIN: 17 G/DL (ref 14–18)
LIPASE: 34 U/L (ref 12–95)
LYMPHOCYTES ABSOLUTE: 1.6 K/UL (ref 1–4.8)
LYMPHOCYTES RELATIVE PERCENT: 12.9 %
MCH RBC QN AUTO: 30.6 PG (ref 27–31.3)
MCHC RBC AUTO-ENTMCNC: 35 % (ref 33–37)
MCV RBC AUTO: 87.4 FL (ref 80–100)
MONOCYTES ABSOLUTE: 1.1 K/UL (ref 0.2–0.8)
MONOCYTES RELATIVE PERCENT: 8.7 %
NEUTROPHILS ABSOLUTE: 9.3 K/UL (ref 1.4–6.5)
NEUTROPHILS RELATIVE PERCENT: 76.2 %
PDW BLD-RTO: 13.5 % (ref 11.5–14.5)
PLATELET # BLD: 254 K/UL (ref 130–400)
PLATELET SLIDE REVIEW: NORMAL
POTASSIUM SERPL-SCNC: 3.6 MEQ/L (ref 3.4–4.9)
RBC # BLD: 5.56 M/UL (ref 4.7–6.1)
SODIUM BLD-SCNC: 141 MEQ/L (ref 135–144)
TOTAL PROTEIN: 7.6 G/DL (ref 6.3–8)
WBC # BLD: 12.2 K/UL (ref 4.8–10.8)

## 2021-04-29 PROCEDURE — 36415 COLL VENOUS BLD VENIPUNCTURE: CPT

## 2021-04-29 PROCEDURE — 99283 EMERGENCY DEPT VISIT LOW MDM: CPT

## 2021-04-29 PROCEDURE — 2580000003 HC RX 258: Performed by: NURSE PRACTITIONER

## 2021-04-29 PROCEDURE — 6370000000 HC RX 637 (ALT 250 FOR IP): Performed by: NURSE PRACTITIONER

## 2021-04-29 PROCEDURE — 96374 THER/PROPH/DIAG INJ IV PUSH: CPT

## 2021-04-29 PROCEDURE — 85025 COMPLETE CBC W/AUTO DIFF WBC: CPT

## 2021-04-29 PROCEDURE — 83690 ASSAY OF LIPASE: CPT

## 2021-04-29 PROCEDURE — 80053 COMPREHEN METABOLIC PANEL: CPT

## 2021-04-29 PROCEDURE — 6360000002 HC RX W HCPCS: Performed by: NURSE PRACTITIONER

## 2021-04-29 RX ORDER — 0.9 % SODIUM CHLORIDE 0.9 %
1000 INTRAVENOUS SOLUTION INTRAVENOUS ONCE
Status: COMPLETED | OUTPATIENT
Start: 2021-04-29 | End: 2021-04-29

## 2021-04-29 RX ORDER — AMOXICILLIN AND CLAVULANATE POTASSIUM 875; 125 MG/1; MG/1
1 TABLET, FILM COATED ORAL ONCE
Status: COMPLETED | OUTPATIENT
Start: 2021-04-29 | End: 2021-04-29

## 2021-04-29 RX ORDER — ONDANSETRON 2 MG/ML
4 INJECTION INTRAMUSCULAR; INTRAVENOUS ONCE
Status: COMPLETED | OUTPATIENT
Start: 2021-04-29 | End: 2021-04-29

## 2021-04-29 RX ORDER — AMOXICILLIN AND CLAVULANATE POTASSIUM 875; 125 MG/1; MG/1
1 TABLET, FILM COATED ORAL 2 TIMES DAILY
Qty: 14 TABLET | Refills: 0 | Status: SHIPPED | OUTPATIENT
Start: 2021-04-29 | End: 2021-05-06

## 2021-04-29 RX ADMIN — AMOXICILLIN AND CLAVULANATE POTASSIUM 1 TABLET: 875; 125 TABLET, FILM COATED ORAL at 23:20

## 2021-04-29 RX ADMIN — SODIUM CHLORIDE 1000 ML: 9 INJECTION, SOLUTION INTRAVENOUS at 22:00

## 2021-04-29 RX ADMIN — ONDANSETRON 4 MG: 2 INJECTION INTRAMUSCULAR; INTRAVENOUS at 22:00

## 2021-04-29 ASSESSMENT — ENCOUNTER SYMPTOMS
COUGH: 0
NAUSEA: 0
SORE THROAT: 0
SINUS PRESSURE: 0
CHEST TIGHTNESS: 0
ABDOMINAL DISTENTION: 0
WHEEZING: 0
SHORTNESS OF BREATH: 0
TROUBLE SWALLOWING: 0
ABDOMINAL PAIN: 1
SINUS PAIN: 0
BACK PAIN: 0
DIARRHEA: 1
VOMITING: 0
RHINORRHEA: 0
COLOR CHANGE: 0

## 2021-04-30 NOTE — ED PROVIDER NOTES
breath and wheezing. Cardiovascular: Negative for chest pain and palpitations. Gastrointestinal: Positive for abdominal pain (discomfort, gas) and diarrhea. Negative for abdominal distention, nausea and vomiting. Genitourinary: Negative for difficulty urinating, dysuria, flank pain, frequency and hematuria. Musculoskeletal: Positive for arthralgias and myalgias. Negative for back pain, gait problem, joint swelling, neck pain and neck stiffness. Aching discomfort of the hips and thighs following the use of Cipro, has decreased since discontinued the Cipro yesterday. Skin: Negative for color change and rash. Neurological: Negative for dizziness, tremors, seizures, syncope, speech difficulty, weakness, light-headedness, numbness and headaches. Except as noted above the remainder of the review of systems was reviewed and negative.        PAST MEDICAL HISTORY     Past Medical History:   Diagnosis Date    Diverticulitis     GERD (gastroesophageal reflux disease)     Hyperlipidemia     Hypertension      Past Surgical History:   Procedure Laterality Date    CHOLECYSTECTOMY      HERNIA REPAIR      rt groin     Social History     Socioeconomic History    Marital status:      Spouse name: None    Number of children: None    Years of education: None    Highest education level: None   Occupational History    None   Social Needs    Financial resource strain: Not hard at all   Bloomer-James insecurity     Worry: Never true     Inability: Never true    Transportation needs     Medical: No     Non-medical: No   Tobacco Use    Smoking status: Never Smoker    Smokeless tobacco: Never Used   Substance and Sexual Activity    Alcohol use: Not Currently     Comment: occas    Drug use: Yes     Types: Marijuana     Comment: daily    Sexual activity: None   Lifestyle    Physical activity     Days per week: None     Minutes per session: None    Stress: None   Relationships    Social connections Talks on phone: None     Gets together: None     Attends Oriental orthodox service: None     Active member of club or organization: None     Attends meetings of clubs or organizations: None     Relationship status: None    Intimate partner violence     Fear of current or ex partner: None     Emotionally abused: None     Physically abused: None     Forced sexual activity: None   Other Topics Concern    None   Social History Narrative    None       SCREENINGS             PHYSICAL EXAM    (up to 7 for level 4, 8 or more for level 5)     ED Triage Vitals [04/29/21 2116]   BP Temp Temp Source Pulse Resp SpO2 Height Weight   (!) 164/100 98.7 °F (37.1 °C) Oral 96 20 97 % 5' 8\" (1.727 m) 275 lb (124.7 kg)       Physical Exam  Constitutional:       General: He is not in acute distress. Appearance: Normal appearance. He is obese. He is not ill-appearing, toxic-appearing or diaphoretic. HENT:      Head: Normocephalic and atraumatic. Right Ear: External ear normal.      Left Ear: External ear normal.      Nose: Nose normal. No congestion or rhinorrhea. Mouth/Throat:      Mouth: Mucous membranes are moist.      Pharynx: Oropharynx is clear. No oropharyngeal exudate or posterior oropharyngeal erythema. Eyes:      General:         Right eye: No discharge. Left eye: No discharge. Conjunctiva/sclera: Conjunctivae normal.      Pupils: Pupils are equal, round, and reactive to light. Neck:      Musculoskeletal: Normal range of motion and neck supple. No neck rigidity or muscular tenderness. Cardiovascular:      Rate and Rhythm: Normal rate and regular rhythm. Pulses: Normal pulses. Pulmonary:      Effort: Pulmonary effort is normal.      Breath sounds: Normal breath sounds. Abdominal:      General: Bowel sounds are increased. There is no distension. Palpations: Abdomen is soft. Tenderness: There is no abdominal tenderness. Musculoskeletal: Normal range of motion.          General: No secondary to a tendinopathy type pain following the use of Cipro. He has discontinued use of this medication states that that pain has resolved. Due to the patient's history of colitis and diverticulitis labs ordered for evaluation. There is a slight elevation of white blood cell count of 12.2 however this is lower than his previous of 12.8. He states that his diarrhea is actually getting better not worse. However due to the patient being on antibiotic with a history of diverticulitis I believe he should be continued on this and will change to Augmentin as a monotherapy for this. Directed to follow-up with primary care provider soon as possible for evaluation return immediately to the ER if any onset of new concerns and worsening addition specifically onset of fever with abdominal pain inability tolerate p.o. intake or point specific pain of the abdomen. Patient verbalized understandable given instruction education. CRITICAL CARE TIME       CONSULTS:  None    PROCEDURES:  Unless otherwise noted below, none     Procedures    FINAL IMPRESSION      1. Diarrhea, unspecified type    2. Generalized abdominal pain    3.  Hx of diverticulitis of colon          DISPOSITION/PLAN   DISPOSITION Discharge - Pending Orders Complete 04/29/2021 10:59:43 PM      PATIENT REFERRED TO:  AMIRA Ayoub CNP  [facility]  [phone]    Call in 1 day        DISCHARGE MEDICATIONS:  New Prescriptions    AMOXICILLIN-CLAVULANATE (AUGMENTIN) 875-125 MG PER TABLET    Take 1 tablet by mouth 2 times daily for 7 days          (Please notethat portions of this note were completed with a voice recognition program.  Efforts were made to edit the dictations but occasionally words are mis-transcribed.)    AMIRA Tomlinson CNP (electronically signed)  Attending Emergency Physician          AMIRA Tomlinson CNP  04/29/21 3986

## 2021-04-30 NOTE — ED TRIAGE NOTES
Patient to ED for c/o stomach irritation and leg cramps. Patient states that he thinks is being symptoms are being caused by Cipro antibiotic that he was given for colitis. States had diarrhea x2 days and hasn't eaten food but has had some soup.

## 2021-06-01 ENCOUNTER — OFFICE VISIT (OUTPATIENT)
Dept: FAMILY MEDICINE CLINIC | Age: 56
End: 2021-06-01
Payer: COMMERCIAL

## 2021-06-01 VITALS
RESPIRATION RATE: 16 BRPM | SYSTOLIC BLOOD PRESSURE: 130 MMHG | BODY MASS INDEX: 44.56 KG/M2 | WEIGHT: 294 LBS | HEIGHT: 68 IN | DIASTOLIC BLOOD PRESSURE: 88 MMHG | HEART RATE: 93 BPM

## 2021-06-01 DIAGNOSIS — G47.33 OBSTRUCTIVE SLEEP APNEA SYNDROME: ICD-10-CM

## 2021-06-01 DIAGNOSIS — K21.9 GASTROESOPHAGEAL REFLUX DISEASE, UNSPECIFIED WHETHER ESOPHAGITIS PRESENT: ICD-10-CM

## 2021-06-01 DIAGNOSIS — I10 ESSENTIAL HYPERTENSION: Primary | ICD-10-CM

## 2021-06-01 DIAGNOSIS — Z12.11 SCREEN FOR COLON CANCER: ICD-10-CM

## 2021-06-01 DIAGNOSIS — N52.9 ERECTILE DYSFUNCTION, UNSPECIFIED ERECTILE DYSFUNCTION TYPE: ICD-10-CM

## 2021-06-01 PROCEDURE — 99214 OFFICE O/P EST MOD 30 MIN: CPT | Performed by: NURSE PRACTITIONER

## 2021-06-01 RX ORDER — OMEPRAZOLE 40 MG/1
CAPSULE, DELAYED RELEASE ORAL
Qty: 30 CAPSULE | Refills: 11 | Status: SHIPPED | OUTPATIENT
Start: 2021-06-01 | End: 2022-06-21 | Stop reason: SDUPTHER

## 2021-06-01 RX ORDER — HYDROCHLOROTHIAZIDE 12.5 MG/1
12.5 CAPSULE, GELATIN COATED ORAL EVERY MORNING
Qty: 30 CAPSULE | Refills: 11 | Status: SHIPPED | OUTPATIENT
Start: 2021-06-01 | End: 2022-06-21 | Stop reason: SDUPTHER

## 2021-06-01 RX ORDER — SILDENAFIL 100 MG/1
TABLET, FILM COATED ORAL
Qty: 30 TABLET | Refills: 3 | Status: SHIPPED | OUTPATIENT
Start: 2021-06-01 | End: 2022-05-20

## 2021-06-01 ASSESSMENT — ENCOUNTER SYMPTOMS
BLURRED VISION: 0
ORTHOPNEA: 0
SHORTNESS OF BREATH: 0

## 2021-06-01 NOTE — PROGRESS NOTES
Subjective  Shamar Jordan, 54 y.o. male presents today with:  Chief Complaint   Patient presents with    Check-Up    Hypertension    Hyperlipidemia        Has had 2 Er visit for diverticulitis-     Muna-  Per wife has sleep apnea and snoring at night,  Has not had sleep study,  Has been going on for years, no previous work up or treatments    Hypertension  This is a chronic problem. The problem is controlled. Pertinent negatives include no anxiety, blurred vision, chest pain, headaches, malaise/fatigue, neck pain, orthopnea, palpitations, peripheral edema, PND, shortness of breath or sweats. Risk factors for coronary artery disease include family history, dyslipidemia, obesity and male gender. Past treatments include diuretics and beta blockers. The current treatment provides significant improvement. Gastroesophageal Reflux  He reports no chest pain. This is a chronic problem. The problem occurs rarely. He has tried a PPI for the symptoms. The treatment provided significant relief. Review of Systems   Constitutional: Negative for malaise/fatigue. Eyes: Negative for blurred vision. Respiratory: Negative for shortness of breath. Cardiovascular: Negative for chest pain, palpitations, orthopnea and PND. Musculoskeletal: Negative for neck pain. Neurological: Negative for headaches.        Past Medical History:   Diagnosis Date    Diverticulitis     GERD (gastroesophageal reflux disease)     Hyperlipidemia     Hypertension      Past Surgical History:   Procedure Laterality Date    CHOLECYSTECTOMY      HERNIA REPAIR      rt groin     Social History     Socioeconomic History    Marital status:      Spouse name: Not on file    Number of children: Not on file    Years of education: Not on file    Highest education level: Not on file   Occupational History    Not on file   Tobacco Use    Smoking status: Never Smoker    Smokeless tobacco: Never Used   Vaping Use    Vaping Use: Never used   Substance and Sexual Activity    Alcohol use: Not Currently     Comment: occas    Drug use: Yes     Types: Marijuana     Comment: daily    Sexual activity: Not on file   Other Topics Concern    Not on file   Social History Narrative    Not on file     Social Determinants of Health     Financial Resource Strain: Low Risk     Difficulty of Paying Living Expenses: Not hard at all   Food Insecurity: No Food Insecurity    Worried About [address] in the Last Year: Never true    [address] in the Last Year: Never true   Transportation Needs: No Transportation Needs    Lack of Transportation (Medical): No    Lack of Transportation (Non-Medical): No   Physical Activity:     Days of Exercise per Week:     Minutes of Exercise per Session:    Stress:     Feeling of Stress :    Social Connections:     Frequency of Communication with Friends and Family:     Frequency of Social Gatherings with Friends and Family:     Attends Taoist Services:     Active Member of Clubs or Organizations:     Attends Club or Organization Meetings:     Marital Status:    Intimate Partner Violence:     Fear of Current or Ex-Partner:     Emotionally Abused:     Physically Abused:     Sexually Abused:      No family history on file. No Known Allergies  Current Outpatient Medications   Medication Sig Dispense Refill    hydroCHLOROthiazide (MICROZIDE) 12.5 MG capsule Take 1 capsule by mouth every morning 30 capsule 11    omeprazole (PRILOSEC) 40 MG delayed release capsule take 1 capsule by mouth once daily 30 capsule 11    sildenafil (VIAGRA) 100 MG tablet TAKE 1/2 TO 1 TABLET BY  MOUTH AS NEEDED FOR ERECTILE DYSFUNCTION. 30 tablet 3     No current facility-administered medications for this visit. PMH, Surgical Hx, Family Hx, and Social Hx reviewed and updated. Health Maintenance reviewed.     Objective    Vitals:    06/01/21 1703   BP: 130/88   Pulse: 93   Resp: 16   Weight: 294 lb (133.4 kg)   Height: 5' 8\" (1.727 m)       Physical Exam  Constitutional:       General: He is not in acute distress. Appearance: He is well-developed. HENT:      Head: Normocephalic and atraumatic. Right Ear: External ear normal.      Left Ear: External ear normal.      Nose: Nose normal.   Eyes:      Conjunctiva/sclera: Conjunctivae normal.      Pupils: Pupils are equal, round, and reactive to light. Neck:      Vascular: No JVD. Cardiovascular:      Rate and Rhythm: Normal rate and regular rhythm. Heart sounds: Normal heart sounds. No murmur heard. Pulmonary:      Effort: Pulmonary effort is normal. No respiratory distress. Breath sounds: Normal breath sounds. No wheezing or rales. Abdominal:      General: Bowel sounds are normal. There is no distension. Palpations: Abdomen is soft. There is no mass. Tenderness: There is no abdominal tenderness. Musculoskeletal:      Cervical back: Neck supple. Skin:     General: Skin is warm and dry. Neurological:      Mental Status: He is alert and oriented to person, place, and time. Assessment & Plan   Wan Glass was seen today for check-up, hypertension and hyperlipidemia. Diagnoses and all orders for this visit:    Essential hypertension  -     hydroCHLOROthiazide (MICROZIDE) 12.5 MG capsule; Take 1 capsule by mouth every morning  -     Lipid Panel; Future  -     Comprehensive Metabolic Panel; Future    Gastroesophageal reflux disease, unspecified whether esophagitis present  -     omeprazole (PRILOSEC) 40 MG delayed release capsule; take 1 capsule by mouth once daily    Erectile dysfunction, unspecified erectile dysfunction type  -     sildenafil (VIAGRA) 100 MG tablet; TAKE 1/2 TO 1 TABLET BY  MOUTH AS NEEDED FOR ERECTILE DYSFUNCTION. Obstructive sleep apnea syndrome  -     Baseline Diagnostic Sleep Study;  Future    Screen for colon cancer  -     Breonna Tolbert MD, Gastroenterology, [facility], [place] daily for recurrent diverticulitis  Orders Placed This Encounter   Procedures    Lipid Panel     Standing Status:   Future     Standing Expiration Date:   6/1/2022     Order Specific Question:   Is Patient Fasting?/# of Hours     Answer:   yes    Comprehensive Metabolic Panel     Standing Status:   Future     Standing Expiration Date:   6/1/2022   Fletcher Hartmann MD, Gastroenterology, [place]     Referral Priority:   Routine     Referral Type:   Eval and Treat     Referral Reason:   Specialty Services Required     Referred to Provider:   Surinder Rosario MD     Requested Specialty:   Gastroenterology     Number of Visits Requested:   1    Baseline Diagnostic Sleep Study     Standing Status:   Future     Standing Expiration Date:   12/1/2021     Order Specific Question:   Adult or Pediatric     Answer:   Adult Study (>7 Years)     Order Specific Question:   Location For Sleep Study     Answer:   [place]     Order Specific Question:   Select Sleep Lab Location     Answer:   [facility]     Orders Placed This Encounter   Medications    hydroCHLOROthiazide (MICROZIDE) 12.5 MG capsule     Sig: Take 1 capsule by mouth every morning     Dispense:  30 capsule     Refill:  11    omeprazole (PRILOSEC) 40 MG delayed release capsule     Sig: take 1 capsule by mouth once daily     Dispense:  30 capsule     Refill:  11    sildenafil (VIAGRA) 100 MG tablet     Sig: TAKE 1/2 TO 1 TABLET BY  MOUTH AS NEEDED FOR ERECTILE DYSFUNCTION.      Dispense:  30 tablet     Refill:  3     Medications Discontinued During This Encounter   Medication Reason    dicyclomine (BENTYL) 10 MG capsule Therapy completed    sildenafil (REVATIO) 20 MG tablet Therapy completed    ondansetron (ZOFRAN) 4 MG tablet ERROR    lovastatin (ALTOPREV) 20 MG extended release tablet ERROR    ondansetron (ZOFRAN ODT) 4 MG disintegrating tablet ERROR    omeprazole (PRILOSEC) 40 MG delayed release capsule REORDER    sildenafil (VIAGRA) 100 MG tablet REORDER    hydroCHLOROthiazide (MICROZIDE) 12.5 MG capsule REORDER     Return in about 6 months (around 12/1/2021). Reviewed with the patient: current clinical status, medications, activities and diet. Side effects, adverse effects of the medication prescribed today, as well as treatment plan/ rationale and result expectations have been discussed with the patient who expresses understanding and desires to proceed. Close follow up to evaluate treatment results and for coordination of care. I have reviewed the patient's medical history in detail and updated the computerized patient record.     Rachell Mariscal, APRN - CNP

## 2021-06-17 ENCOUNTER — HOSPITAL ENCOUNTER (EMERGENCY)
Age: 56
Discharge: HOME | End: 2021-06-17
Attending: EMERGENCY MEDICINE
Payer: COMMERCIAL

## 2021-06-17 VITALS
HEART RATE: 108 BPM | OXYGEN SATURATION: 97 % | RESPIRATION RATE: 16 BRPM | BODY MASS INDEX: 40.73 KG/M2 | SYSTOLIC BLOOD PRESSURE: 172 MMHG | WEIGHT: 275 LBS | HEIGHT: 69 IN | DIASTOLIC BLOOD PRESSURE: 99 MMHG | TEMPERATURE: 98.3 F

## 2021-06-17 DIAGNOSIS — R42 DIZZINESS: Primary | ICD-10-CM

## 2021-06-17 PROCEDURE — 99284 EMERGENCY DEPT VISIT MOD MDM: CPT

## 2021-06-17 ASSESSMENT — ENCOUNTER SYMPTOMS
VOMITING: 0
BACK PAIN: 0
SORE THROAT: 0
COUGH: 0
NAUSEA: 0
ABDOMINAL PAIN: 0
SHORTNESS OF BREATH: 0
DIARRHEA: 0

## 2021-06-17 NOTE — ED PROVIDER NOTES
[facility] ED  eMERGENCYdEPARTMENT eNCOUnter      Pt Name: Andi Melo  MRN: [id number]  [place] 1965  Date of evaluation: 6/17/2021  Nancy Yeung MD    CHIEF COMPLAINT           HPI  Andi Melo is a 54 y.o. male per chart review has a h/o HTN, Hpl, GERD presents to the ED with dizziness. Pt notes on Tuesday he was outside all day and then had an episode of dizziness for 30 min. Pt notes it was hot outside. Pt notes gradual onset, mild, intermittent, dizziness for 30 min. Pt notes it resolved after rest and hydration. Pt denies fever, n/v, cp, sob, dysuria, diarrhea. Pt only wanted to come in to get a note to return to work. Pt denies dizziness yesterday or today. ROS  Review of Systems   Constitutional: Negative for activity change, chills and fever. HENT: Negative for ear pain and sore throat. Eyes: Negative for visual disturbance. Respiratory: Negative for cough and shortness of breath. Cardiovascular: Negative for chest pain, palpitations and leg swelling. Gastrointestinal: Negative for abdominal pain, diarrhea, nausea and vomiting. Genitourinary: Negative for dysuria. Musculoskeletal: Negative for back pain. Skin: Negative for rash. Neurological: Positive for dizziness. Negative for weakness. Except as noted above the remainder of the review of systems was reviewed and negative.        PAST MEDICAL HISTORY     Past Medical History:   Diagnosis Date    Diverticulitis     GERD (gastroesophageal reflux disease)     Hyperlipidemia     Hypertension          SURGICAL HISTORY       Past Surgical History:   Procedure Laterality Date    CHOLECYSTECTOMY      HERNIA REPAIR      rt groin         CURRENTMEDICATIONS       Previous Medications    HYDROCHLOROTHIAZIDE (MICROZIDE) 12.5 MG CAPSULE    Take 1 capsule by mouth every morning    OMEPRAZOLE (PRILOSEC) 40 MG DELAYED RELEASE CAPSULE    take 1 capsule by mouth once daily    SILDENAFIL (VIAGRA) 100 MG TABLET    TAKE 1/2 TO 1 TABLET BY  MOUTH AS NEEDED FOR ERECTILE DYSFUNCTION. ALLERGIES     Patient has no known allergies. FAMILY HISTORY     History reviewed. No pertinent family history. SOCIAL HISTORY       Social History     Socioeconomic History    Marital status:      Spouse name: None    Number of children: None    Years of education: None    Highest education level: None   Occupational History    None   Tobacco Use    Smoking status: Never Smoker    Smokeless tobacco: Never Used   Vaping Use    Vaping Use: Never used   Substance and Sexual Activity    Alcohol use: Not Currently     Comment: occas    Drug use: Yes     Types: Marijuana     Comment: daily    Sexual activity: None   Other Topics Concern    None   Social History Narrative    None     Social Determinants of Health     Financial Resource Strain: Low Risk     Difficulty of Paying Living Expenses: Not hard at all   Food Insecurity: No Food Insecurity    Worried About Running Out of Food in the Last Year: Never true    Tiana of Food in the Last Year: Never true   Transportation Needs: No Transportation Needs    Lack of Transportation (Medical): No    Lack of Transportation (Non-Medical):  No   Physical Activity:     Days of Exercise per Week:     Minutes of Exercise per Session:    Stress:     Feeling of Stress :    Social Connections:     Frequency of Communication with Friends and Family:     Frequency of Social Gatherings with Friends and Family:     Attends Latter day Services:     Active Member of Clubs or Organizations:     Attends Club or Organization Meetings:     Marital Status:    Intimate Partner Violence:     Fear of Current or Ex-Partner:     Emotionally Abused:     Physically Abused:     Sexually Abused:          PHYSICAL EXAM       ED Triage Vitals [06/17/21 0823]   BP Temp Temp Source Pulse Resp SpO2 Height Weight   (!) 172/99 98.3 °F (36.8 °C) Oral 108 16 97 % 5' 9\" (1.753 m) 275 lb (124.7 kg)       Physical Exam  Vitals and nursing note reviewed. Constitutional:       Appearance: He is well-developed. HENT:      Head: Normocephalic. Right Ear: External ear normal.      Left Ear: External ear normal.   Eyes:      Conjunctiva/sclera: Conjunctivae normal.      Pupils: Pupils are equal, round, and reactive to light. Cardiovascular:      Rate and Rhythm: Normal rate and regular rhythm. Heart sounds: Normal heart sounds. Pulmonary:      Effort: Pulmonary effort is normal.      Breath sounds: Normal breath sounds. Abdominal:      General: Bowel sounds are normal. There is no distension. Palpations: Abdomen is soft. Tenderness: There is no abdominal tenderness. Musculoskeletal:         General: Normal range of motion. Cervical back: Normal range of motion and neck supple. Skin:     General: Skin is warm and dry. Neurological:      Mental Status: He is alert and oriented to person, place, and time. Psychiatric:         Mood and Affect: Mood normal.           MDM  53 yo male presents to the ED with dizziness now resolved. Pt is afebrile, hemodynamically stable. Pt did not want any tests or blood work. Pt just wanted a note to return to work. Pt given dizziness warning signs and will f/u with pcp. Pt understands plan. FINAL IMPRESSION      1.  Dizziness          DISPOSITION/PLAN   DISPOSITION Decision To Discharge 06/17/2021 08:45:41 AM        DISCHARGE MEDICATIONS:  [unfilled]         Lily Campa MD(electronically signed)  Attending Emergency Physician            Lily Campa MD  06/17/21 6861

## 2022-01-05 ENCOUNTER — APPOINTMENT (OUTPATIENT)
Dept: CT IMAGING | Age: 57
End: 2022-01-05
Payer: COMMERCIAL

## 2022-01-05 ENCOUNTER — APPOINTMENT (OUTPATIENT)
Dept: GENERAL RADIOLOGY | Age: 57
End: 2022-01-05
Payer: COMMERCIAL

## 2022-01-05 ENCOUNTER — HOSPITAL ENCOUNTER (EMERGENCY)
Age: 57
Discharge: HOME | End: 2022-01-05
Attending: EMERGENCY MEDICINE
Payer: COMMERCIAL

## 2022-01-05 VITALS
RESPIRATION RATE: 16 BRPM | OXYGEN SATURATION: 96 % | HEART RATE: 119 BPM | DIASTOLIC BLOOD PRESSURE: 105 MMHG | TEMPERATURE: 97.8 F | SYSTOLIC BLOOD PRESSURE: 167 MMHG

## 2022-01-05 DIAGNOSIS — U07.1 COVID-19: Primary | ICD-10-CM

## 2022-01-05 LAB
ALBUMIN SERPL-MCNC: 4.5 G/DL (ref 3.5–4.6)
ALP BLD-CCNC: 81 U/L (ref 35–104)
ALT SERPL-CCNC: 43 U/L (ref 0–41)
ANION GAP SERPL CALCULATED.3IONS-SCNC: 17 MEQ/L (ref 9–15)
AST SERPL-CCNC: 45 U/L (ref 0–40)
BASOPHILS ABSOLUTE: 0 K/UL (ref 0–0.2)
BASOPHILS RELATIVE PERCENT: 0.7 %
BILIRUB SERPL-MCNC: 0.7 MG/DL (ref 0.2–0.7)
BUN BLDV-MCNC: 15 MG/DL (ref 6–20)
CALCIUM SERPL-MCNC: 9.4 MG/DL (ref 8.5–9.9)
CHLORIDE BLD-SCNC: 101 MEQ/L (ref 95–107)
CO2: 20 MEQ/L (ref 20–31)
CREAT SERPL-MCNC: 1.34 MG/DL (ref 0.7–1.2)
EOSINOPHILS ABSOLUTE: 0 K/UL (ref 0–0.7)
EOSINOPHILS RELATIVE PERCENT: 0.2 %
GFR AFRICAN AMERICAN: >60
GFR NON-AFRICAN AMERICAN: 55.1
GLOBULIN: 3.1 G/DL (ref 2.3–3.5)
GLUCOSE BLD-MCNC: 126 MG/DL (ref 70–99)
HCT VFR BLD CALC: 51.3 % (ref 42–52)
HEMOGLOBIN: 17.9 G/DL (ref 14–18)
LYMPHOCYTES ABSOLUTE: 0.8 K/UL (ref 1–4.8)
LYMPHOCYTES RELATIVE PERCENT: 15.1 %
MCH RBC QN AUTO: 29.9 PG (ref 27–31.3)
MCHC RBC AUTO-ENTMCNC: 35 % (ref 33–37)
MCV RBC AUTO: 85.4 FL (ref 80–100)
MONOCYTES ABSOLUTE: 0.9 K/UL (ref 0.2–0.8)
MONOCYTES RELATIVE PERCENT: 17.2 %
NEUTROPHILS ABSOLUTE: 3.5 K/UL (ref 1.4–6.5)
NEUTROPHILS RELATIVE PERCENT: 66.8 %
PDW BLD-RTO: 13.6 % (ref 11.5–14.5)
PLATELET # BLD: 210 K/UL (ref 130–400)
POC CREATININE WHOLE BLOOD: 1.3
POTASSIUM SERPL-SCNC: 2.9 MEQ/L (ref 3.4–4.9)
RBC # BLD: 6 M/UL (ref 4.7–6.1)
SARS-COV-2, NAAT: DETECTED
SODIUM BLD-SCNC: 138 MEQ/L (ref 135–144)
TOTAL PROTEIN: 7.6 G/DL (ref 6.3–8)
TROPONIN: <0.01 NG/ML (ref 0–0.01)
WBC # BLD: 5.2 K/UL (ref 4.8–10.8)

## 2022-01-05 PROCEDURE — 96361 HYDRATE IV INFUSION ADD-ON: CPT

## 2022-01-05 PROCEDURE — 71046 X-RAY EXAM CHEST 2 VIEWS: CPT

## 2022-01-05 PROCEDURE — 6360000002 HC RX W HCPCS: Performed by: PHYSICIAN ASSISTANT

## 2022-01-05 PROCEDURE — 93005 ELECTROCARDIOGRAM TRACING: CPT | Performed by: EMERGENCY MEDICINE

## 2022-01-05 PROCEDURE — 74177 CT ABD & PELVIS W/CONTRAST: CPT

## 2022-01-05 PROCEDURE — 80053 COMPREHEN METABOLIC PANEL: CPT

## 2022-01-05 PROCEDURE — 2580000003 HC RX 258: Performed by: PHYSICIAN ASSISTANT

## 2022-01-05 PROCEDURE — 84484 ASSAY OF TROPONIN QUANT: CPT

## 2022-01-05 PROCEDURE — 99284 EMERGENCY DEPT VISIT MOD MDM: CPT

## 2022-01-05 PROCEDURE — 87635 SARS-COV-2 COVID-19 AMP PRB: CPT

## 2022-01-05 PROCEDURE — 96374 THER/PROPH/DIAG INJ IV PUSH: CPT

## 2022-01-05 PROCEDURE — 6370000000 HC RX 637 (ALT 250 FOR IP): Performed by: EMERGENCY MEDICINE

## 2022-01-05 PROCEDURE — 36415 COLL VENOUS BLD VENIPUNCTURE: CPT

## 2022-01-05 PROCEDURE — 85025 COMPLETE CBC W/AUTO DIFF WBC: CPT

## 2022-01-05 PROCEDURE — 6360000004 HC RX CONTRAST MEDICATION: Performed by: PHYSICIAN ASSISTANT

## 2022-01-05 PROCEDURE — 96375 TX/PRO/DX INJ NEW DRUG ADDON: CPT

## 2022-01-05 RX ORDER — KETOROLAC TROMETHAMINE 30 MG/ML
30 INJECTION, SOLUTION INTRAMUSCULAR; INTRAVENOUS ONCE
Status: COMPLETED | OUTPATIENT
Start: 2022-01-05 | End: 2022-01-05

## 2022-01-05 RX ORDER — POTASSIUM CHLORIDE 20 MEQ/1
40 TABLET, EXTENDED RELEASE ORAL ONCE
Status: COMPLETED | OUTPATIENT
Start: 2022-01-05 | End: 2022-01-05

## 2022-01-05 RX ORDER — ONDANSETRON 2 MG/ML
4 INJECTION INTRAMUSCULAR; INTRAVENOUS ONCE
Status: COMPLETED | OUTPATIENT
Start: 2022-01-05 | End: 2022-01-05

## 2022-01-05 RX ORDER — 0.9 % SODIUM CHLORIDE 0.9 %
1000 INTRAVENOUS SOLUTION INTRAVENOUS ONCE
Status: COMPLETED | OUTPATIENT
Start: 2022-01-05 | End: 2022-01-05

## 2022-01-05 RX ADMIN — KETOROLAC TROMETHAMINE 30 MG: 30 INJECTION, SOLUTION INTRAMUSCULAR at 18:03

## 2022-01-05 RX ADMIN — POTASSIUM CHLORIDE 40 MEQ: 1500 TABLET, EXTENDED RELEASE ORAL at 19:59

## 2022-01-05 RX ADMIN — ONDANSETRON 4 MG: 2 INJECTION INTRAMUSCULAR; INTRAVENOUS at 18:04

## 2022-01-05 RX ADMIN — SODIUM CHLORIDE 1000 ML: 9 INJECTION, SOLUTION INTRAVENOUS at 18:03

## 2022-01-05 RX ADMIN — IOPAMIDOL 100 ML: 612 INJECTION, SOLUTION INTRAVENOUS at 18:26

## 2022-01-05 ASSESSMENT — ENCOUNTER SYMPTOMS
ABDOMINAL DISTENTION: 0
DIARRHEA: 1
VOICE CHANGE: 0
NAUSEA: 1
ABDOMINAL PAIN: 1
SHORTNESS OF BREATH: 0
ANAL BLEEDING: 0
EYE DISCHARGE: 0

## 2022-01-05 ASSESSMENT — PAIN DESCRIPTION - PAIN TYPE
TYPE: ACUTE PAIN
TYPE: ACUTE PAIN

## 2022-01-05 ASSESSMENT — PAIN SCALES - GENERAL
PAINLEVEL_OUTOF10: 6
PAINLEVEL_OUTOF10: 6

## 2022-01-05 ASSESSMENT — PAIN DESCRIPTION - LOCATION
LOCATION: ABDOMEN
LOCATION: ABDOMEN

## 2022-01-05 NOTE — DISCHARGE INSTR - COC
Continuity of Care Form    Patient Name: Jadiel Nicholson   :  1965  MRN:  [id number]    Admit date:  2022  Discharge date:  ***    Code Status Order: No Order   Advance Directives:      Admitting Physician:  No admitting provider for patient encounter.   PCP: AMIRA Gonzalez CNP    Discharging Nurse: [facility] Unit/Room#: SFT01/SFT01  Discharging Unit Phone Number: ***    Emergency Contact:   Extended Emergency Contact Information  Primary Emergency Contact: Shawna Tim  Address: [address] [place] [address] Road, [address] Phone: [phone]  Work Phone: [phone]  Mobile Phone: [phone]  Relation: Spouse    Past Surgical History:  Past Surgical History:   Procedure Laterality Date    CHOLECYSTECTOMY      HERNIA REPAIR      rt groin       Immunization History:   Immunization History   Administered Date(s) Administered    Influenza Virus Vaccine 10/04/2016, 10/09/2020    Influenza, Ronald Nim, IM, PF (6 mo and older Fluzone, Flulaval, Fluarix, and 3 yrs and older Afluria) 10/05/2018, 10/04/2019, 10/11/2020       Active Problems:  Patient Active Problem List   Diagnosis Code    Sebaceous cyst L72.3    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    Lymphangioma, any site D18.1    Hemangioma of skin and subcutaneous tissue D18.01       Isolation/Infection:   Isolation            No Isolation          Patient Infection Status       Infection Onset Added Last Indicated Last Indicated By Review Planned Expiration Resolved Resolved By    COVID-19 (Rule Out) 22 COVID-19, Rapid (Ordered) 22      Resolved    COVID-19 (Rule Out) 20 COVID-19 Ambulatory (Ordered)   20 Rule-Out Test Resulted    COVID-19 (Rule Out) 20 COVID-19 (Ordered)   20 Rule-Out Test Resulted            Nurse Assessment:  Last Vital Signs: BP (!) 167/105   Pulse 119   Temp 97.8 °F (36.6 °C)   Resp 16 SpO2 96%     Last documented pain score (0-10 scale):    Last Weight:   Wt Readings from Last 1 Encounters:   21 275 lb (124.7 kg)     Mental Status:  {IP PT MENTAL STATUS:80165}    IV Access:  { VERONA IV ACCESS:587268685}    Nursing Mobility/ADLs:  Walking   {CHP DME FTHB:137510265}  Transfer  {CHP DME MMLY:583502259}  Bathing  {CHP DME TIAU:640339021}  Dressing  {CHP DME EDUX:075415528}  Toileting  {CHP DME DJXZ:217023411}  Feeding  {CHP DME CYNB:268595264}  Med Admin  {P DME LDC}  Med Delivery   { VERONA MED Delivery:094123597}    Wound Care Documentation and Therapy:        Elimination:  Continence: Bowel: {YES / IQ:40592}  Bladder: {YES / ES:33994}  Urinary Catheter: {Urinary Catheter:657258075}   Colostomy/Ileostomy/Ileal Conduit: {YES / TF:70352}       Date of Last BM: ***  No intake or output data in the 24 hours ending 22 1746  No intake/output data recorded.     Safety Concerns:     [address] Safety Concerns:435518416}    Impairments/Disabilities:      [address] Impairments/Disabilities:329370604}    Nutrition Therapy:  Current Nutrition Therapy:   [address] Diet List:521488471}    Routes of Feeding: {P DME Other Feedings:059730279}  Liquids: {Slp liquid thickness:38577}  Daily Fluid Restriction: {CHP DME Yes amt example:996470102}  Last Modified Barium Swallow with Video (Video Swallowing Test): {Done Not Done SHWM:977407728}    Treatments at the Time of Hospital Discharge:   Respiratory Treatments: ***  Oxygen Therapy:  {Therapy; copd oxygen:65597}  Ventilator:    {[facility] Vent PPOD:355067477}    Rehab Therapies: {THERAPEUTIC INTERVENTION:3444752467}  Weight Bearing Status/Restrictions: [employer] Weight Bearin}  Other Medical Equipment (for information only, NOT a DME order):  {EQUIPMENT:812027479}  Other Treatments: ***    Patient's personal belongings (please select all that are sent with patient):  {MILES DME Belongings:348452688}    RN SIGNATURE: {Esignature:489405697}    CASE MANAGEMENT/SOCIAL WORK SECTION    Inpatient Status Date: ***    Readmission Risk Assessment Score:  Readmission Risk              Risk of Unplanned Readmission:  0           Discharging to Facility/ Agency   Name:   Address:  Phone:  Fax:    Dialysis Facility (if applicable)   Name:  Address:  Dialysis Schedule:  Phone:  Fax:    / signature: {Esignature:937456736}    PHYSICIAN SECTION    Prognosis: {Prognosis:0414386068}    Condition at Discharge: [address] [place] [address] Patient Condition:579871798}    Rehab Potential (if transferring to Rehab): {Prognosis:0675834517}    Recommended Labs or Other Treatments After Discharge: ***    Physician Certification: I certify the above information and transfer of Aylin Naas  is necessary for the continuing treatment of the diagnosis listed and that he requires {Admit to Appropriate Level of Care:13927} for {GREATER/LESS:054929785} 30 days.      Update Admission H&P: {CHP DME Changes in RMKHB:470519544}    PHYSICIAN SIGNATURE:  {Esignature:344927637}

## 2022-01-05 NOTE — ED PROVIDER NOTES
[facility] [facility] ED  eMERGENCY dEPARTMENT eNCOUnter      Pt Name: Jadiel Nicholson  MRN: [id number]  [place] 1965  Date of evaluation: 1/5/2022  Provider: Laura Alcantara MD    CHIEF COMPLAINT       Chief Complaint   Patient presents with    Illness     c/o  general body aches and chills  with headache   since  sunday          HISTORY OF PRESENT ILLNESS   (Location/Symptom, Timing/Onset,Context/Setting, Quality, Duration, Modifying Factors, Severity)  Note limiting factors. Jadiel Nicholson is a 64 y.o. male who presents to the emergency department  Abdominal pain chills headache body aches diarrhea since Sunday. Pt has hx colitis and diverticulitis. Pt denies back pain rectal bleeding bruising. Symptoms moderate in severity touch/ motion worsen abd pain. nothing improves sx. HPI    NursingNotes were reviewed. REVIEW OF SYSTEMS    (2-9 systems for level 4, 10 or more for level 5)     Review of Systems   Constitutional: Positive for chills. Negative for activity change, appetite change and unexpected weight change. HENT: Negative for ear discharge, nosebleeds and voice change. Eyes: Negative for discharge. Respiratory: Negative for shortness of breath. Cardiovascular: Negative for chest pain. Gastrointestinal: Positive for abdominal pain, diarrhea and nausea. Negative for abdominal distention and anal bleeding. Genitourinary: Negative for dysuria and hematuria. Musculoskeletal: Negative for joint swelling. Skin: Negative for pallor. Neurological: Positive for headaches. Hematological: Does not bruise/bleed easily. All other systems reviewed and are negative. Except as noted above the remainder of the review of systems was reviewed and negative.        PAST MEDICAL HISTORY     Past Medical History:   Diagnosis Date    Diverticulitis     GERD (gastroesophageal reflux disease)     Hyperlipidemia     Hypertension          SURGICALHISTORY       Past Surgical History: Procedure Laterality Date    CHOLECYSTECTOMY      HERNIA REPAIR      rt groin         CURRENT MEDICATIONS       Previous Medications    HYDROCHLOROTHIAZIDE (MICROZIDE) 12.5 MG CAPSULE    Take 1 capsule by mouth every morning    OMEPRAZOLE (PRILOSEC) 40 MG DELAYED RELEASE CAPSULE    take 1 capsule by mouth once daily    SILDENAFIL (VIAGRA) 100 MG TABLET    TAKE 1/2 TO 1 TABLET BY  MOUTH AS NEEDED FOR ERECTILE DYSFUNCTION. Patient has no known allergies. FAMILY HISTORY     No family history on file. SOCIAL HISTORY       Social History     Socioeconomic History    Marital status:      Spouse name: Not on file    Number of children: Not on file    Years of education: Not on file    Highest education level: Not on file   Occupational History    Not on file   Tobacco Use    Smoking status: Never Smoker    Smokeless tobacco: Never Used   Vaping Use    Vaping Use: Never used   Substance and Sexual Activity    Alcohol use: Not Currently     Comment: occas    Drug use: Yes     Types: Marijuana Caroline Heys)     Comment: daily    Sexual activity: Not on file   Other Topics Concern    Not on file   Social History Narrative    Not on file     Social Determinants of Health     Financial Resource Strain: Low Risk     Difficulty of Paying Living Expenses: Not hard at all   Food Insecurity: No Food Insecurity    Worried About [facility] in the Last Year: Never true    [facility] in the Last Year: Never true   Transportation Needs: No Transportation Needs    Lack of Transportation (Medical): No    Lack of Transportation (Non-Medical):  No   Physical Activity:     Days of Exercise per Week: Not on file    Minutes of Exercise per Session: Not on file   Stress:     Feeling of Stress : Not on file   Social Connections:     Frequency of Communication with Friends and Family: Not on file    Frequency of Social Gatherings with Friends and Family: Not on file    Attends Yazdanism Services: Not on file    Active Member of Clubs or Organizations: Not on file    Attends Club or Organization Meetings: Not on file    Marital Status: Not on file   Intimate Partner Violence:     Fear of Current or Ex-Partner: Not on file    Emotionally Abused: Not on file    Physically Abused: Not on file    Sexually Abused: Not on file   Housing Stability:     Unable to Pay for Housing in the Last Year: Not on file    Number of [place] in the Last Year: Not on file    Unstable Housing in the Last Year: Not on file       SCREENINGS   Willa Coma Scale  Eye Opening: Spontaneous  Best Verbal Response: Oriented  Best Motor Response: Obeys commands  Willa Coma Scale Score: 15  Ebola Virus Disease (EVD) Screening   Temp: 97.8 °F (36.6 °C)  CIWA Assessment  BP: (!) 167/105  Pulse: 119    PHYSICAL EXAM    (up to 7 for level 4, 8 or more for level 5)     ED Triage Vitals [01/05/22 1723]   BP Temp Temp src Pulse Resp SpO2 Height Weight   (!) 167/105 97.8 °F (36.6 °C) -- 119 16 96 % -- --       Physical Exam  Vitals and nursing note reviewed. Constitutional:       General: He is not in acute distress. Appearance: He is well-developed. HENT:      Head: Normocephalic and atraumatic. Right Ear: External ear normal.      Left Ear: External ear normal.      Nose: Nose normal.      Mouth/Throat:      Mouth: Mucous membranes are moist.   Eyes:      General:         Right eye: No discharge. Left eye: No discharge. Pupils: Pupils are equal, round, and reactive to light. Cardiovascular:      Rate and Rhythm: Regular rhythm. Tachycardia present. Heart sounds: Normal heart sounds. Pulmonary:      Effort: Pulmonary effort is normal. No respiratory distress. Breath sounds: Normal breath sounds. No stridor. Abdominal:      General: Bowel sounds are normal. There is no distension. Palpations: Abdomen is soft. Tenderness: There is abdominal tenderness. Musculoskeletal:         General: Normal range of motion. Cervical back: Normal range of motion and neck supple. Skin:     General: Skin is warm. Findings: No erythema. Neurological:      Mental Status: He is alert and oriented to person, place, and time. RESULTS     EKG: All EKG's are interpreted by the Emergency Department Physician who either signs or Co-signsthis chart in the absence of a cardiologist.       ekg sinus tachycardia rate 117 st  aBNORMALITY CA 142MS,  MS, 362 MS  RADIOLOGY:   Non-plain filmimages such as CT, Ultrasound and MRI are read by the radiologist. Plain radiographic images are visualized and preliminarily interpreted by the emergency physician with the below findings:         Interpretation per the Radiologist below, if available at the time ofthis note:    XR CHEST (2 VW)   Final Result   RIGHT LOWER LUNG ATELECTASIS/PNEUMONIA. CT ABDOMEN PELVIS W IV CONTRAST Additional Contrast? None   Final Result      Sigmoid diverticulosis. Hepatic steatosis. Cholecystectomy. All CT scans at this facility use dose modulation, iterative reconstruction, and/or weight based dosing when appropriate to reduce radiation dose to as low as reasonably achievable.                      ED BEDSIDE ULTRASOUND:   Performed by ED Physician - none    LABS:  Labs Reviewed   COVID-19, RAPID - Abnormal; Notable for the following components:       Result Value    SARS-CoV-2, NAAT DETECTED (*)     All other components within normal limits    Narrative:     Kalie Rodriguez  [facility] tel. [phone],  Previous panic on this admission - call not needed per SOP, 01/05/2022 18:30,  by GORDO   CBC WITH AUTO DIFFERENTIAL - Abnormal; Notable for the following components:    Lymphocytes Absolute 0.8 (*)     Monocytes Absolute 0.9 (*)     All other components within normal limits   COMPREHENSIVE METABOLIC PANEL - Abnormal; Notable for the following components:    Potassium 2.9 (*) Anion Gap 17 (*)     Glucose 126 (*)     CREATININE 1.34 (*)     GFR Non- 55.1 (*)     ALT 43 (*)     AST 45 (*)     All other components within normal limits    Narrative:     Luis ECHEVARRIA tel. [phone],  POTASSIUM results called to and read back by DR Renae Antonio, 01/05/2022 19:21, by  [facility]   POCT CREATININE - URINE - Normal   TROPONIN   PROTIME-INR   APTT   LIPASE   AMYLASE       All other labs were within normal range or not returned as of this dictation. EMERGENCY DEPARTMENT COURSE and DIFFERENTIAL DIAGNOSIS/MDM:   Vitals:    Vitals:    01/05/22 1723   BP: (!) 167/105   Pulse: 119   Resp: 16   Temp: 97.8 °F (36.6 °C)   SpO2: 96%            MDM CT scan unremarkable for any acute pathology. X-ray concerning for right lower lobe atelectasis versus early pneumonia. He is not hypoxic. He has no respiratory complaints. He has been vaccinated. Testing positive for COVID-19 he appears to have the general effects with GI and myalgias. He is given IV fluids. His potassium is low and is encouraged to eat potassium related foods. Return here for worsening symptoms. CRITICAL CARE TIME   Total Critical Care time was   minutes, excluding separately reportableprocedures. There was a high probability of clinicallysignificant/life threatening deterioration in the patient's condition which required my urgent intervention.        CONSULTS:  None    PROCEDURES:  Unless otherwise noted below, none     Procedures    FINAL IMPRESSION      1. COVID-19          DISPOSITION/PLAN   DISPOSITION        PATIENT REFERRED TO:  Terri Recio, APRN - CNP  [address] [place] [phone]    In 1 week        DISCHARGE MEDICATIONS:  New Prescriptions    No medications on file          (Please note that portions of this note were completed with a voice recognition program.  Efforts were made to edit the dictations but occasionally words are mis-transcribed.)    Breonna Langley MD (electronically signed)  Attending Emergency Physician        Dung Ocampo MD  01/05/22 [address] Amarjit Milton MD  01/05/22 [id number]

## 2022-01-05 NOTE — Clinical Note
Jeff Bella was seen and treated in our emergency department on 1/5/2022. He may return to work on 01/10/2022. Off work until Monday, 1/10/2022 due to Covid illness. It is safe for Elio to return on Monday     If you have any questions or concerns, please don't hesitate to call.       Marilee Garibay MD

## 2022-01-06 LAB
EKG ATRIAL RATE: 117 BPM
EKG P AXIS: 58 DEGREES
EKG P-R INTERVAL: 142 MS
EKG Q-T INTERVAL: 362 MS
EKG QRS DURATION: 104 MS
EKG QTC CALCULATION (BAZETT): 504 MS
EKG R AXIS: 19 DEGREES
EKG T AXIS: 20 DEGREES
EKG VENTRICULAR RATE: 117 BPM
GFR AFRICAN AMERICAN: >60
GFR NON-AFRICAN AMERICAN: 57
PERFORMED ON: ABNORMAL
POC CREATININE: 1.3 MG/DL (ref 0.9–1.3)
POC SAMPLE TYPE: ABNORMAL

## 2022-01-06 PROCEDURE — 93010 ELECTROCARDIOGRAM REPORT: CPT | Performed by: INTERNAL MEDICINE

## 2022-03-24 ENCOUNTER — TELEPHONE (OUTPATIENT)
Dept: FAMILY MEDICINE CLINIC | Age: 57
End: 2022-03-24

## 2022-03-24 ENCOUNTER — OFFICE VISIT (OUTPATIENT)
Dept: FAMILY MEDICINE CLINIC | Age: 57
End: 2022-03-24
Payer: COMMERCIAL

## 2022-03-24 VITALS
WEIGHT: 280.2 LBS | TEMPERATURE: 97.2 F | HEIGHT: 69 IN | HEART RATE: 92 BPM | DIASTOLIC BLOOD PRESSURE: 86 MMHG | BODY MASS INDEX: 41.5 KG/M2 | SYSTOLIC BLOOD PRESSURE: 138 MMHG | OXYGEN SATURATION: 97 %

## 2022-03-24 DIAGNOSIS — R10.33 PERIUMBILICAL ABDOMINAL PAIN: Primary | ICD-10-CM

## 2022-03-24 DIAGNOSIS — Z87.19 HISTORY OF DIVERTICULITIS: ICD-10-CM

## 2022-03-24 PROCEDURE — G8484 FLU IMMUNIZE NO ADMIN: HCPCS | Performed by: NURSE PRACTITIONER

## 2022-03-24 PROCEDURE — 3017F COLORECTAL CA SCREEN DOC REV: CPT | Performed by: NURSE PRACTITIONER

## 2022-03-24 PROCEDURE — G8417 CALC BMI ABV UP PARAM F/U: HCPCS | Performed by: NURSE PRACTITIONER

## 2022-03-24 PROCEDURE — 99213 OFFICE O/P EST LOW 20 MIN: CPT | Performed by: NURSE PRACTITIONER

## 2022-03-24 PROCEDURE — 1036F TOBACCO NON-USER: CPT | Performed by: NURSE PRACTITIONER

## 2022-03-24 PROCEDURE — G8427 DOCREV CUR MEDS BY ELIG CLIN: HCPCS | Performed by: NURSE PRACTITIONER

## 2022-03-24 RX ORDER — CIPROFLOXACIN 500 MG/1
500 TABLET, FILM COATED ORAL 2 TIMES DAILY
Qty: 20 TABLET | Refills: 0 | Status: SHIPPED | OUTPATIENT
Start: 2022-03-24 | End: 2022-04-03

## 2022-03-24 RX ORDER — METRONIDAZOLE 500 MG/1
500 TABLET ORAL 2 TIMES DAILY
Qty: 20 TABLET | Refills: 0 | Status: SHIPPED | OUTPATIENT
Start: 2022-03-24 | End: 2022-04-03

## 2022-03-24 SDOH — ECONOMIC STABILITY: FOOD INSECURITY: WITHIN THE PAST 12 MONTHS, YOU WORRIED THAT YOUR FOOD WOULD RUN OUT BEFORE YOU GOT MONEY TO BUY MORE.: NEVER TRUE

## 2022-03-24 SDOH — ECONOMIC STABILITY: FOOD INSECURITY: WITHIN THE PAST 12 MONTHS, THE FOOD YOU BOUGHT JUST DIDN'T LAST AND YOU DIDN'T HAVE MONEY TO GET MORE.: NEVER TRUE

## 2022-03-24 ASSESSMENT — PATIENT HEALTH QUESTIONNAIRE - PHQ9
SUM OF ALL RESPONSES TO PHQ9 QUESTIONS 1 & 2: 0
SUM OF ALL RESPONSES TO PHQ QUESTIONS 1-9: 0
1. LITTLE INTEREST OR PLEASURE IN DOING THINGS: 0
SUM OF ALL RESPONSES TO PHQ QUESTIONS 1-9: 0
2. FEELING DOWN, DEPRESSED OR HOPELESS: 0
SUM OF ALL RESPONSES TO PHQ QUESTIONS 1-9: 0
SUM OF ALL RESPONSES TO PHQ QUESTIONS 1-9: 0

## 2022-03-24 ASSESSMENT — ENCOUNTER SYMPTOMS
VOMITING: 0
RHINORRHEA: 0
NAUSEA: 0
WHEEZING: 0
DIARRHEA: 1
COUGH: 0
SHORTNESS OF BREATH: 0
ABDOMINAL PAIN: 1

## 2022-03-24 ASSESSMENT — SOCIAL DETERMINANTS OF HEALTH (SDOH): HOW HARD IS IT FOR YOU TO PAY FOR THE VERY BASICS LIKE FOOD, HOUSING, MEDICAL CARE, AND HEATING?: NOT HARD AT ALL

## 2022-03-24 NOTE — LETTER
[facility]  [address] [address]  Phone: [phone]  Fax: [address] [facility], AMIRA - CNP        March 24, 2022     Patient: Randolph Phillips   YOB: 1965   Date of Visit: 3/24/2022       To Whom It May Concern: It is my medical opinion that Isabella Mckay may return to work on 3/28 . If you have any questions or concerns, please don't hesitate to call.     Sincerely,        AMIRA Packer - CNP

## 2022-03-24 NOTE — PROGRESS NOTES
Subjective:      Patient ID: Gloria Oreilly is a 64 y.o. male who presents today for:  Chief Complaint   Patient presents with    Abdominal Pain     Patient is here c/o abdominal pain. Pt states pain is located in middle of stomach, feels like a stomachache. Pt states he has diverticulitis, states pain could be from a flare-up. Pt states issue has been ongoing for about 4 days, states he has used water and popsicles.          HPI      Started Monday   History of diverticulitis   He feels this is what it is   He feels he is improving at [facility] point '  Stomach hurts by the belly button   He had it about 6-7 months ago -looked up in the chart and it was one year ago   really no pain this time  Just discomfort, not really \"pain\"  Hes having diarrhea 2-3 times per day            Past Medical History:   Diagnosis Date    Diverticulitis     GERD (gastroesophageal reflux disease)     Hyperlipidemia     Hypertension      Past Surgical History:   Procedure Laterality Date    CHOLECYSTECTOMY      HERNIA REPAIR      rt groin     Social History     Socioeconomic History    Marital status:      Spouse name: Not on file    Number of children: Not on file    Years of education: Not on file    Highest education level: Not on file   Occupational History    Not on file   Tobacco Use    Smoking status: Never Smoker    Smokeless tobacco: Never Used   Vaping Use    Vaping Use: Never used   Substance and Sexual Activity    Alcohol use: Not Currently     Comment: occas    Drug use: Yes     Types: Marijuana Ronak Free)     Comment: daily    Sexual activity: Not on file   Other Topics Concern    Not on file   Social History Narrative    Not on file     Social Determinants of Health     Financial Resource Strain: Low Risk     Difficulty of Paying Living Expenses: Not hard at all   Food Insecurity: No Food Insecurity    Worried About Running Out of Food in the Last Year: Never true    [address] in the Last Year: Never true   Transportation Needs:     Lack of Transportation (Medical): Not on file    Lack of Transportation (Non-Medical): Not on file   Physical Activity:     Days of Exercise per Week: Not on file    Minutes of Exercise per Session: Not on file   Stress:     Feeling of Stress : Not on file   Social Connections:     Frequency of Communication with Friends and Family: Not on file    Frequency of Social Gatherings with Friends and Family: Not on file    Attends Judaism Services: Not on file    Active Member of [address] [place] [address] [employer] or Organizations: Not on file    Attends Club or Organization Meetings: Not on file    Marital Status: Not on file   Intimate Partner Violence:     Fear of Current or Ex-Partner: Not on file    Emotionally Abused: Not on file    Physically Abused: Not on file    Sexually Abused: Not on file   Housing Stability:     Unable to Pay for Housing in the Last Year: Not on file    Number of [place] in the Last Year: Not on file    Unstable Housing in the Last Year: Not on file     History reviewed. No pertinent family history. No Known Allergies  Current Outpatient Medications   Medication Sig Dispense Refill    ciprofloxacin (CIPRO) 500 MG tablet Take 1 tablet by mouth 2 times daily for 10 days 20 tablet 0    metroNIDAZOLE (FLAGYL) 500 MG tablet Take 1 tablet by mouth 2 times daily for 10 days 20 tablet 0    hydroCHLOROthiazide (MICROZIDE) 12.5 MG capsule Take 1 capsule by mouth every morning 30 capsule 11    omeprazole (PRILOSEC) 40 MG delayed release capsule take 1 capsule by mouth once daily 30 capsule 11    sildenafil (VIAGRA) 100 MG tablet TAKE 1/2 TO 1 TABLET BY  MOUTH AS NEEDED FOR ERECTILE DYSFUNCTION. 30 tablet 3     No current facility-administered medications for this visit. Review of Systems   Constitutional: Positive for appetite change, chills and diaphoresis. Negative for fever. HENT: Negative for congestion and rhinorrhea.     Respiratory: Negative for cough, shortness of breath and wheezing. Gastrointestinal: Positive for abdominal pain and diarrhea. Negative for nausea and vomiting. Skin: Negative for rash. Neurological: Negative for dizziness, light-headedness and headaches. Psychiatric/Behavioral: Negative for agitation, confusion and hallucinations. Objective:   /86 (Site: Right Upper Arm, Position: Sitting, Cuff Size: Large Adult)   Pulse 92   Temp 97.2 °F (36.2 °C)   Ht 5' 9\" (1.753 m)   Wt 280 lb 3.2 oz (127.1 kg)   SpO2 97%   BMI 41.38 kg/m²     Physical Exam  Vitals reviewed. Constitutional:       Appearance: Normal appearance. He is obese. HENT:      Head: Normocephalic and atraumatic. Right Ear: Hearing, tympanic membrane, ear canal and external ear normal. No middle ear effusion. No foreign body. Tympanic membrane is not injected, erythematous or bulging. Left Ear: Hearing, tympanic membrane, ear canal and external ear normal.  No middle ear effusion. No foreign body. Tympanic membrane is not injected, erythematous or bulging. Nose: Nose normal. No congestion or rhinorrhea. Right Nostril: No foreign body. Left Nostril: No foreign body. Right Turbinates: Not enlarged. Left Turbinates: Not enlarged. Right Sinus: No maxillary sinus tenderness or frontal sinus tenderness. Left Sinus: No maxillary sinus tenderness or frontal sinus tenderness. Mouth/Throat:      Lips: Pink. Mouth: Mucous membranes are moist.      Pharynx: Oropharynx is clear. Uvula midline. No pharyngeal swelling, oropharyngeal exudate, posterior oropharyngeal erythema or uvula swelling. Tonsils: No tonsillar exudate or tonsillar abscesses. Eyes:      General: Lids are normal.         Right eye: No foreign body. Left eye: No foreign body. Extraocular Movements: Extraocular movements intact.       Conjunctiva/sclera: Conjunctivae normal.   Cardiovascular:      Rate and Rhythm: Normal rate and regular rhythm. Heart sounds: Normal heart sounds, S1 normal and S2 normal.   Pulmonary:      Effort: Pulmonary effort is normal. No tachypnea, accessory muscle usage or respiratory distress. Breath sounds: Normal breath sounds. No wheezing or rhonchi. Chest:   Breasts:      Right: No supraclavicular adenopathy. Left: No supraclavicular adenopathy. Abdominal:      General: Abdomen is protuberant. Bowel sounds are normal.      Palpations: Abdomen is soft. Tenderness: There is no abdominal tenderness. There is no guarding. Musculoskeletal:         General: Normal range of motion. Cervical back: Normal range of motion. Lymphadenopathy:      Cervical: No cervical adenopathy. Upper Body:      Right upper body: No supraclavicular adenopathy. Left upper body: No supraclavicular adenopathy. Skin:     General: Skin is warm and dry. Capillary Refill: Capillary refill takes less than 2 seconds. Neurological:      General: No focal deficit present. Mental Status: He is alert and oriented to person, place, and time. Cranial Nerves: Cranial nerves are intact. Gait: Gait is intact. Psychiatric:         Mood and Affect: Mood normal.         Speech: Speech normal.         Behavior: Behavior normal. Behavior is cooperative. Thought Content: Thought content normal.         Judgment: Judgment normal.         Assessment:       Diagnosis Orders   1. Periumbilical abdominal pain  ciprofloxacin (CIPRO) 500 MG tablet    metroNIDAZOLE (FLAGYL) 500 MG tablet   2. History of diverticulitis  ciprofloxacin (CIPRO) 500 MG tablet    metroNIDAZOLE (FLAGYL) 500 MG tablet     No results found for this visit on 03/24/22. Plan:   Pt had CT abd in Jan therefore would like to hold off on imaging at this time. Will treat as diverticulitis. If does not improve let me know or go tot he ER. Push fluids, light diet until better.        Assessment & Plan Juwan Holman was seen today for abdominal pain. Diagnoses and all orders for this visit:    Periumbilical abdominal pain  -     ciprofloxacin (CIPRO) 500 MG tablet; Take 1 tablet by mouth 2 times daily for 10 days  -     metroNIDAZOLE (FLAGYL) 500 MG tablet; Take 1 tablet by mouth 2 times daily for 10 days    History of diverticulitis  -     ciprofloxacin (CIPRO) 500 MG tablet; Take 1 tablet by mouth 2 times daily for 10 days  -     metroNIDAZOLE (FLAGYL) 500 MG tablet; Take 1 tablet by mouth 2 times daily for 10 days      No orders of the defined types were placed in this encounter. Orders Placed This Encounter   Medications    ciprofloxacin (CIPRO) 500 MG tablet     Sig: Take 1 tablet by mouth 2 times daily for 10 days     Dispense:  20 tablet     Refill:  0    metroNIDAZOLE (FLAGYL) 500 MG tablet     Sig: Take 1 tablet by mouth 2 times daily for 10 days     Dispense:  20 tablet     Refill:  0     There are no discontinued medications. Return if symptoms worsen or fail to improve. Reviewed with the patient/family: current clinical status & medications. Side effects of the medication prescribed today, as well as treatment plan/rationale and result expectations have been discussed with the patient/family who expresses understanding. Patient will be discharged home in stable condition. Follow up with PCP to evaluate treatment results or return if symptoms worsen or fail to improve. Discussed signs and symptoms which require immediate follow-up in ED/call to 911. Understanding verbalized. I have reviewed the patient's medical history in detail and updated the computerized patient record.     Arjun Holden, APRN - CNP

## 2022-03-24 NOTE — TELEPHONE ENCOUNTER
Patient received a letter at his visit today stating he can return to work on 3/28 but needs the letter to excuse him from the days he missed also. Patient stated he has been off since 3/22. Please advise. Thank you.

## 2022-03-24 NOTE — LETTER
[facility]  [address] [address]  Phone: [phone]  Fax: [phone]            March 24, 2022     Patient: Gloria Oreilly   YOB: 1965   Date of Visit: 3/24/2022       To Whom It May Concern: It is my medical opinion that Ramo Caller may return to work on 3/28. Please excuse him from work the last couple days. If you have any questions or concerns, please don't hesitate to call.     Sincerely,        Electronically signed by AMIRA Rincon CNP on 3/24/22 at 12:26 PM EDT

## 2022-03-24 NOTE — PATIENT INSTRUCTIONS
Patient Education        Abdominal Pain: Care Instructions  Your Care Instructions     Abdominal pain has many possible causes. Some aren't serious and get better on their own in a few days. Others need more testing and treatment. If your pain continues or gets worse, you need to be rechecked and may need more tests to find out what is wrong. You may need surgery to correct the problem. Don't ignore new symptoms, such as fever, nausea and vomiting, urination problems, pain that gets worse, and dizziness. These may be signs of a more serious problem. Your doctor may have recommended a follow-up visit in the next 8 to 12 hours. If you are not getting better, you may need more tests or treatment. The doctor has checked you carefully, but problems can develop later. If you notice any problems or new symptoms, get medical treatment right away. Follow-up care is a key part of your treatment and safety. Be sure to make and go to all appointments, and call your doctor if you are having problems. It's also a good idea to know your test results and keep a list of the medicines you take. How can you care for yourself at home? · Rest until you feel better. · To prevent dehydration, drink plenty of fluids. Choose water and other clear liquids until you feel better. If you have kidney, heart, or liver disease and have to limit fluids, talk with your doctor before you increase the amount of fluids you drink. · If your stomach is upset, eat mild foods, such as rice, dry toast or crackers, bananas, and applesauce. Try eating several small meals instead of two or three large ones. · Wait until 48 hours after all symptoms have gone away before you have spicy foods, alcohol, and drinks that contain caffeine. · Do not eat foods that are high in fat. · Avoid anti-inflammatory medicines such as aspirin, ibuprofen (Advil, Motrin), and naproxen (Aleve). These can cause stomach upset.  Talk to your doctor if you take daily aspirin for another health problem. When should you call for help? Call 911 anytime you think you may need emergency care. For example, call if:    · You passed out (lost consciousness).     · You pass maroon or very bloody stools.     · You vomit blood or what looks like coffee grounds.     · You have new, severe belly pain. Call your doctor now or seek immediate medical care if:    · Your pain gets worse, especially if it becomes focused in one area of your belly.     · You have a new or higher fever.     · Your stools are black and look like tar, or they have streaks of blood.     · You have unexpected vaginal bleeding.     · You have symptoms of a urinary tract infection. These may include:  ? Pain when you urinate. ? Urinating more often than usual.  ? Blood in your urine.     · You are dizzy or lightheaded, or you feel like you may faint. Watch closely for changes in your health, and be sure to contact your doctor if:    · You are not getting better after 1 day (24 hours). Where can you learn more? Go to https://Villij.InVenture. org and sign in to your VoltDB account. Enter O177 in the [facility] box to learn more about \"Abdominal Pain: Care Instructions. \"     If you do not have an account, please click on the \"Sign Up Now\" link. Current as of: July 1, 2021               Content Version: 13.1  © 2006-2021 Healthwise, Incorporated. Care instructions adapted under license by [facility] ([facility]). If you have questions about a medical condition or this instruction, always ask your healthcare professional. [place] [address] any warranty or liability for your use of this information. Patient Education        Diverticulitis: Care Instructions  Overview     Diverticulitis occurs when pouches form in the wall of the colon and become inflamed or infected. It can be very painful. Doctors aren't sure what causes diverticulitis.  There is no proof that foods such as nuts, seeds, or berries cause it or make it worse. A low-fiber diet may cause the colon to work harder to push stool forward. Pouches may form because of this extra work. It may be hard to think about healthy eating while you're in pain. But as you recover, you might think about how you can use healthy eating for overall better health. Healthy eating may help you avoid future attacks. Follow-up care is a key part of your treatment and safety. Be sure to make and go to all appointments, and call your doctor if you are having problems. It's also a good idea to know your test results and keep a list of the medicines you take. How can you care for yourself at home? · Drink plenty of fluids. If you have kidney, heart, or liver disease and have to limit fluids, talk with your doctor before you increase the amount of fluids you drink. · Stay with liquids or a bland diet (plain rice, bananas, dry toast or crackers, applesauce) until you are feeling better. Then you can return to regular foods and slowly increase the amount of fiber in your diet. · Use a heating pad set on low on your belly to relieve mild cramps and pain. · Get extra rest until you are feeling better. · Be safe with medicines. Read and follow all instructions on the label. ? If the doctor gave you a prescription medicine for pain, take it as prescribed. ? If you are not taking a prescription pain medicine, ask your doctor if you can take an over-the-counter medicine. · If your doctor prescribed antibiotics, take them as directed. Do not stop taking them just because you feel better. You need to take the full course of antibiotics. · Do not use laxatives or enemas unless your doctor tells you to use them. When should you call for help? Call your doctor now or seek immediate medical care if:    · You have a fever.     · You are vomiting.     · You have new or worse belly pain.     · You cannot pass stools or gas.    Watch closely for changes in your health, and be sure to contact your doctor if you have any problems. Where can you learn more? Go to https://chpepiceweb.Eco Products. org and sign in to your Locaweb account. Enter H901 in the [facility] box to learn more about \"Diverticulitis: Care Instructions. \"     If you do not have an account, please click on the \"Sign Up Now\" link. Current as of: September 8, 2021               Content Version: 13.1  © 2006-2021 [employer]. Care instructions adapted under license by [facility] ([facility]). If you have questions about a medical condition or this instruction, always ask your healthcare professional. [place] [address] any warranty or liability for your use of this information. Patient Education        Learning About Diverticulosis and Diverticulitis  What are diverticulosis and diverticulitis? In diverticulosis and diverticulitis, pouches called diverticula form in the wall of the large intestine, or colon. · In diverticulosis, the pouches do not cause any pain or other symptoms. · In diverticulitis, the pouches get inflamed or infected and cause symptoms. Doctors aren't sure what causes these pouches in the colon. But they think that a low-fiber diet may play a role. Without fiber to add bulk to the stool, the colon has to work harder than normal to push the stool forward. The pressure from this may cause pouches to form in weak spots along the colon. Some people with diverticulosis get diverticulitis. But experts don't know why this happens. What are the symptoms? · In diverticulosis, most people don't have symptoms. But pouches sometimes bleed. · In diverticulitis, symptoms may last from a few hours to a week or more. They include:  ? Belly pain. This is usually in the lower left side. It is sometimes worse when you move. This is the most common symptom. ? Fever and chills. ? Bloating and gas. ? Diarrhea or constipation.   ? Nausea and sometimes vomiting.  ? Not feeling like eating. How can you prevent diverticulitis? You may be able to lower your chance of getting diverticulitis. You can do this by taking steps to prevent constipation. · Eat fruits, vegetables, beans, and whole grains every day. These foods are high in fiber. · Drink plenty of fluids. If you have kidney, heart, or liver disease and have to limit fluids, talk with your doctor before you increase the amount of fluids you drink. · Get at least 30 minutes of exercise on most days of the week. Walking is a good choice. You also may want to do other activities, such as running, swimming, cycling, or playing tennis or team sports. · Take a fiber supplement, such as Citrucel or Metamucil, every day if needed. Read and follow all instructions on the label. · Schedule time each day for a bowel movement. Having a daily routine may help. Take your time and do not strain when having a bowel movement. Some people avoid nuts, seeds, berries, and popcorn. They believe that these foods might get trapped in the diverticula and cause pain. But there is no proof that these foods cause diverticulitis or make it worse. How are these problems treated? · The best way to treat diverticulosis is to avoid constipation. · Treatment for diverticulitis includes antibiotics. It often includes a change in your diet. You may need only liquids at first. Your doctor may suggest pain medicines for pain or belly cramps. In some cases, surgery may be needed. Follow-up care is a key part of your treatment and safety. Be sure to make and go to all appointments, and call your doctor if you are having problems. It's also a good idea to know your test results and keep a list of the medicines you take. Where can you learn more? Go to https://diann.ClubJumpr.com. org and sign in to your bitHound account.  Enter M488 in the [employer] box to learn more about \"Learning About Diverticulosis and Diverticulitis. \"     If you do not have an account, please click on the \"Sign Up Now\" link. Current as of: September 8, 2021               Content Version: 13.1  © 2006-2021 Healthwise, Incorporated. Care instructions adapted under license by [facility] ([facility]). If you have questions about a medical condition or this instruction, always ask your healthcare professional. [address] any warranty or liability for your use of this information.

## 2022-05-20 DIAGNOSIS — N52.9 ERECTILE DYSFUNCTION, UNSPECIFIED ERECTILE DYSFUNCTION TYPE: ICD-10-CM

## 2022-05-20 RX ORDER — SILDENAFIL 100 MG/1
TABLET, FILM COATED ORAL
Qty: 30 TABLET | Refills: 0 | Status: SHIPPED | OUTPATIENT
Start: 2022-05-20

## 2022-06-04 DIAGNOSIS — I10 ESSENTIAL HYPERTENSION: ICD-10-CM

## 2022-06-04 DIAGNOSIS — K21.9 GASTROESOPHAGEAL REFLUX DISEASE, UNSPECIFIED WHETHER ESOPHAGITIS PRESENT: ICD-10-CM

## 2022-06-04 RX ORDER — HYDROCHLOROTHIAZIDE 12.5 MG/1
12.5 CAPSULE, GELATIN COATED ORAL EVERY MORNING
Qty: 30 CAPSULE | Refills: 11 | OUTPATIENT
Start: 2022-06-04

## 2022-06-04 RX ORDER — OMEPRAZOLE 40 MG/1
CAPSULE, DELAYED RELEASE ORAL
Qty: 30 CAPSULE | Refills: 11 | OUTPATIENT
Start: 2022-06-04

## 2022-06-08 ENCOUNTER — OFFICE VISIT (OUTPATIENT)
Dept: FAMILY MEDICINE CLINIC | Age: 57
End: 2022-06-08
Payer: COMMERCIAL

## 2022-06-08 VITALS
WEIGHT: 270 LBS | DIASTOLIC BLOOD PRESSURE: 120 MMHG | OXYGEN SATURATION: 96 % | HEIGHT: 69 IN | SYSTOLIC BLOOD PRESSURE: 170 MMHG | BODY MASS INDEX: 39.99 KG/M2 | HEART RATE: 101 BPM | TEMPERATURE: 97.2 F

## 2022-06-08 DIAGNOSIS — A09 DIARRHEA OF INFECTIOUS ORIGIN: ICD-10-CM

## 2022-06-08 DIAGNOSIS — R11.0 NAUSEA: Primary | ICD-10-CM

## 2022-06-08 DIAGNOSIS — I10 PRIMARY HYPERTENSION: ICD-10-CM

## 2022-06-08 PROCEDURE — 1036F TOBACCO NON-USER: CPT | Performed by: PHYSICIAN ASSISTANT

## 2022-06-08 PROCEDURE — G8427 DOCREV CUR MEDS BY ELIG CLIN: HCPCS | Performed by: PHYSICIAN ASSISTANT

## 2022-06-08 PROCEDURE — 99213 OFFICE O/P EST LOW 20 MIN: CPT | Performed by: PHYSICIAN ASSISTANT

## 2022-06-08 PROCEDURE — G8417 CALC BMI ABV UP PARAM F/U: HCPCS | Performed by: PHYSICIAN ASSISTANT

## 2022-06-08 PROCEDURE — 3017F COLORECTAL CA SCREEN DOC REV: CPT | Performed by: PHYSICIAN ASSISTANT

## 2022-06-08 RX ORDER — ONDANSETRON 4 MG/1
4 TABLET, ORALLY DISINTEGRATING ORAL 3 TIMES DAILY PRN
Qty: 21 TABLET | Refills: 0 | Status: SHIPPED | OUTPATIENT
Start: 2022-06-08

## 2022-06-08 ASSESSMENT — ENCOUNTER SYMPTOMS
BLOOD IN STOOL: 0
DIARRHEA: 1
EYES NEGATIVE: 1
NAUSEA: 1
VOMITING: 1
RESPIRATORY NEGATIVE: 1
CONSTIPATION: 0
ALLERGIC/IMMUNOLOGIC NEGATIVE: 1
ABDOMINAL PAIN: 1
RECTAL PAIN: 0

## 2022-06-08 NOTE — PROGRESS NOTES
Subjective  Rico Tovar, 64 y.o. male presents today with:  Chief Complaint   Patient presents with    Headache     Patient presents today c/o headache x3 days    Abdominal Pain           No results found for: LABA1C  Lab Results   Component Value Date    CREATININE 1.3 01/05/2022     Lab Results   Component Value Date    ALT 43 (H) 01/05/2022    AST 45 (H) 01/05/2022     No results found for: CHOL, TRIG, HDL, LDLCALC, LDLDIRECT       HPI  Patient is here being seen acutely for complaint of abdominal pain nausea vomiting diarrhea for the past 4 days. His wife has similar symptoms denies rectal bleeding. Had one episode of diarrhea today. he has no appetite and has not eaten or drank fluids in 2 days. He also has not taken his blood pressure medication. States his blood pressure is \"normal\" on medicine    Review of Systems   Constitutional: Positive for appetite change and fatigue. HENT: Negative. Eyes: Negative. Respiratory: Negative. Gastrointestinal: Positive for abdominal pain, diarrhea, nausea and vomiting. Negative for blood in stool, constipation and rectal pain. Genitourinary: Negative. Allergic/Immunologic: Negative. All other systems reviewed and are negative.         Past Medical History:   Diagnosis Date    Diverticulitis     GERD (gastroesophageal reflux disease)     Hyperlipidemia     Hypertension      Past Surgical History:   Procedure Laterality Date    CHOLECYSTECTOMY      HERNIA REPAIR      rt groin     Social History     Socioeconomic History    Marital status:      Spouse name: Not on file    Number of children: Not on file    Years of education: Not on file    Highest education level: Not on file   Occupational History    Not on file   Tobacco Use    Smoking status: Never Smoker    Smokeless tobacco: Never Used   Vaping Use    Vaping Use: Never used   Substance and Sexual Activity    Alcohol use: Not Currently     Comment: occas    Drug use: Yes     Types: Marijuana [place])     Comment: daily    Sexual activity: Not on file   Other Topics Concern    Not on file   Social History Narrative    Not on file     Social Determinants of Health     Financial Resource Strain: Low Risk     Difficulty of Paying Living Expenses: Not hard at all   Food Insecurity: No Food Insecurity    Worried About Running Out of Food in the Last Year: Never true    [address] in the Last Year: Never true   Transportation Needs:     Lack of Transportation (Medical): Not on file    Lack of Transportation (Non-Medical): Not on file   Physical Activity:     Days of Exercise per Week: Not on file    Minutes of Exercise per Session: Not on file   Stress:     Feeling of Stress : Not on file   Social Connections:     Frequency of Communication with Friends and Family: Not on file    Frequency of Social Gatherings with Friends and Family: Not on file    Attends [employer] Services: Not on file    Active Member of [address] [place] [address] or Organizations: Not on file    Attends Club or Organization Meetings: Not on file    Marital Status: Not on file   Intimate Partner Violence:     Fear of Current or Ex-Partner: Not on file    Emotionally Abused: Not on file    Physically Abused: Not on file    Sexually Abused: Not on file   Housing Stability:     Unable to Pay for Housing in the Last Year: Not on file    Number of [place] in the Last Year: Not on file    Unstable Housing in the Last Year: Not on file     No family history on file.   No Known Allergies     Current Outpatient Medications   Medication Sig Dispense Refill    ondansetron (ZOFRAN-ODT) 4 MG disintegrating tablet Take 1 tablet by mouth 3 times daily as needed for Nausea or Vomiting 21 tablet 0    sildenafil (VIAGRA) 100 MG tablet TAKE 1/2 TO 1 TABLET BY MOUTH as needed for erectile dysfunction 30 tablet 0    hydroCHLOROthiazide (MICROZIDE) 12.5 MG capsule Take 1 capsule by mouth every morning 30 capsule 11    omeprazole (PRILOSEC) 40 MG delayed release capsule take 1 capsule by mouth once daily 30 capsule 11     No current facility-administered medications for this visit. Objective    Vitals:    06/08/22 1343 06/08/22 1351   BP: (!) 168/120 (!) 170/120   Site: Right Upper Arm Left Upper Arm   Position: Sitting Sitting   Cuff Size: Medium Adult Large Adult   Pulse: (!) 101    Temp: 97.2 °F (36.2 °C)    TempSrc: Temporal    SpO2: 96%    Weight: 270 lb (122.5 kg)    Height: 5' 9\" (1.753 m)      Physical Exam  Constitutional:       Appearance: He is obese. He is ill-appearing. HENT:      Head: Normocephalic and atraumatic. Eyes:      Extraocular Movements: Extraocular movements intact. Conjunctiva/sclera: Conjunctivae normal.      Pupils: Pupils are equal, round, and reactive to light. Neck:      Thyroid: No thyromegaly. Cardiovascular:      Rate and Rhythm: Normal rate and regular rhythm. Heart sounds: Normal heart sounds. No murmur heard. Pulmonary:      Effort: Pulmonary effort is normal. No respiratory distress. Breath sounds: Normal breath sounds. No wheezing, rhonchi or rales. Abdominal:      General: Bowel sounds are normal.      Palpations: Abdomen is soft. There is no mass. Tenderness: There is no abdominal tenderness. There is no guarding. Musculoskeletal:         General: Normal range of motion. Cervical back: Normal range of motion and neck supple. Lymphadenopathy:      Cervical: No cervical adenopathy. Skin:     General: Skin is warm and dry. Coloration: Skin is not jaundiced or pale. Neurological:      Mental Status: He is alert and oriented to person, place, and time. Psychiatric:         Mood and Affect: Mood normal.         Thought Content: Thought content normal.         Judgment: Judgment normal.              Assessment & Plan    Diagnosis Orders   1. Nausea  ondansetron (ZOFRAN-ODT) 4 MG disintegrating tablet   2.  Diarrhea of infectious origin 3. Primary hypertension           No orders of the defined types were placed in this encounter. Orders Placed This Encounter   Medications    ondansetron (ZOFRAN-ODT) 4 MG disintegrating tablet     Sig: Take 1 tablet by mouth 3 times daily as needed for Nausea or Vomiting     Dispense:  21 tablet     Refill:  0     There are no discontinued medications. Return for follow up with your PCP as directed. Call with bp reading tomorrow  Take an additional dose of hctz 12.5mg today  Increase fluids.  Brat charu Huggins PA-C

## 2022-06-21 DIAGNOSIS — I10 ESSENTIAL HYPERTENSION: ICD-10-CM

## 2022-06-21 DIAGNOSIS — K21.9 GASTROESOPHAGEAL REFLUX DISEASE, UNSPECIFIED WHETHER ESOPHAGITIS PRESENT: ICD-10-CM

## 2022-06-21 RX ORDER — HYDROCHLOROTHIAZIDE 12.5 MG/1
12.5 CAPSULE, GELATIN COATED ORAL EVERY MORNING
Qty: 30 CAPSULE | Refills: 11 | Status: SHIPPED | OUTPATIENT
Start: 2022-06-21

## 2022-06-21 RX ORDER — OMEPRAZOLE 40 MG/1
CAPSULE, DELAYED RELEASE ORAL
Qty: 30 CAPSULE | Refills: 11 | Status: SHIPPED | OUTPATIENT
Start: 2022-06-21

## 2022-06-23 DIAGNOSIS — N52.9 ERECTILE DYSFUNCTION, UNSPECIFIED ERECTILE DYSFUNCTION TYPE: ICD-10-CM

## 2022-06-24 RX ORDER — SILDENAFIL 100 MG/1
TABLET, FILM COATED ORAL
Qty: 30 TABLET | Refills: 0 | OUTPATIENT
Start: 2022-06-24

## 2023-03-14 ENCOUNTER — OFFICE VISIT (OUTPATIENT)
Dept: PRIMARY CARE | Facility: CLINIC | Age: 58
End: 2023-03-14
Payer: COMMERCIAL

## 2023-03-14 VITALS
OXYGEN SATURATION: 97 % | RESPIRATION RATE: 20 BRPM | BODY MASS INDEX: 44.31 KG/M2 | HEART RATE: 96 BPM | TEMPERATURE: 97.6 F | WEIGHT: 289 LBS | DIASTOLIC BLOOD PRESSURE: 110 MMHG | SYSTOLIC BLOOD PRESSURE: 178 MMHG

## 2023-03-14 DIAGNOSIS — R19.7 ACUTE DIARRHEA: Primary | ICD-10-CM

## 2023-03-14 PROCEDURE — 99214 OFFICE O/P EST MOD 30 MIN: CPT | Performed by: NURSE PRACTITIONER

## 2023-03-14 PROCEDURE — 1036F TOBACCO NON-USER: CPT | Performed by: NURSE PRACTITIONER

## 2023-03-14 RX ORDER — SILDENAFIL 100 MG/1
100 TABLET, FILM COATED ORAL DAILY PRN
COMMUNITY
Start: 2023-02-14

## 2023-03-14 RX ORDER — HYDROCHLOROTHIAZIDE 12.5 MG/1
12.5 CAPSULE ORAL
COMMUNITY

## 2023-03-14 RX ORDER — ONDANSETRON 4 MG/1
4 TABLET, FILM COATED ORAL EVERY 8 HOURS PRN
Qty: 20 TABLET | Refills: 0 | Status: SHIPPED | OUTPATIENT
Start: 2023-03-14 | End: 2023-03-18

## 2023-03-14 RX ORDER — OMEPRAZOLE 40 MG/1
40 CAPSULE, DELAYED RELEASE ORAL DAILY
COMMUNITY

## 2023-03-14 ASSESSMENT — ENCOUNTER SYMPTOMS
SINUS PAIN: 0
FEVER: 0
COUGH: 0
HEADACHES: 1
NAUSEA: 1
DIARRHEA: 1
RHINORRHEA: 0
VOMITING: 1
CHILLS: 1
SHORTNESS OF BREATH: 0
ABDOMINAL PAIN: 1

## 2023-03-14 NOTE — PROGRESS NOTES
Patient says that he went to a Chinese buffet on Sunday and when he got home, after a few hours, he had diarrhea, nausea, vomiting and stomach cramps. Pt has chills. Pt says that he is vaccinated against COVID.               Review of Systems   Constitutional:  Positive for chills. Negative for fever.   HENT:  Negative for congestion, rhinorrhea and sinus pain.    Respiratory:  Negative for cough and shortness of breath.    Cardiovascular:  Negative for chest pain.   Gastrointestinal:  Positive for abdominal pain (cramps), diarrhea, nausea and vomiting (once).   Neurological:  Positive for headaches.       Objective   BP (!) 178/110   Pulse 96   Temp 36.4 °C (97.6 °F)   Resp 20   Wt 131 kg (289 lb)   SpO2 97%   BMI 44.31 kg/m²     Physical Exam  Vitals reviewed.   Constitutional:       General: He is not in acute distress.     Appearance: Normal appearance. He is not toxic-appearing.   HENT:      Head: Atraumatic.   Eyes:      Conjunctiva/sclera: Conjunctivae normal.   Cardiovascular:      Rate and Rhythm: Normal rate and regular rhythm.      Pulses: Normal pulses.      Heart sounds: Normal heart sounds. No murmur heard.  Pulmonary:      Effort: Pulmonary effort is normal.      Breath sounds: Normal breath sounds. No wheezing or rhonchi.   Abdominal:      General: Bowel sounds are normal. There is distension (softly).      Palpations: Abdomen is soft.      Tenderness: There is no abdominal tenderness. There is no guarding.   Musculoskeletal:         General: Normal range of motion.      Cervical back: Neck supple.   Skin:     General: Skin is warm and dry.   Neurological:      General: No focal deficit present.      Mental Status: He is alert.   Psychiatric:         Mood and Affect: Mood normal.         Behavior: Behavior normal.         Assessment/Plan   Problem List Items Addressed This Visit    None  Visit Diagnoses       Acute diarrhea    -  Primary    Relevant Orders    C. difficile, PCR    Ova/Para +  Giardia/Cryptosporidium Antigen    Stool Pathogen Panel, PCR    Amylase    Bilirubin, Total    CBC    Comprehensive Metabolic Panel    Lipase        Patient declined need for COVID testing today. Pt declined the need to go to the ER. Patient started on zofran to use as needed for nausea and vomiting. Patient to do stool studies and blood work as well. Advised pt to stick to BRAT diet and drink plenty of fluids. Advised that patient is to go to the ER for any worsening abdominal pain, nausea, vomiting or new/concerning symptoms; he agreed. Pt to follow up in 2-3 days if no better despite the use of the medication. Will call patient when results become available.

## 2023-06-12 ENCOUNTER — OFFICE VISIT (OUTPATIENT)
Dept: FAMILY MEDICINE CLINIC | Age: 58
End: 2023-06-12
Payer: COMMERCIAL

## 2023-06-12 VITALS
DIASTOLIC BLOOD PRESSURE: 88 MMHG | BODY MASS INDEX: 44.14 KG/M2 | WEIGHT: 298 LBS | SYSTOLIC BLOOD PRESSURE: 144 MMHG | HEART RATE: 86 BPM | HEIGHT: 69 IN

## 2023-06-12 DIAGNOSIS — R97.20 ELEVATED PSA: ICD-10-CM

## 2023-06-12 DIAGNOSIS — K57.92 DIVERTICULITIS: Primary | ICD-10-CM

## 2023-06-12 DIAGNOSIS — K21.9 GASTROESOPHAGEAL REFLUX DISEASE, UNSPECIFIED WHETHER ESOPHAGITIS PRESENT: ICD-10-CM

## 2023-06-12 DIAGNOSIS — I10 ESSENTIAL HYPERTENSION: ICD-10-CM

## 2023-06-12 DIAGNOSIS — N52.9 ERECTILE DYSFUNCTION, UNSPECIFIED ERECTILE DYSFUNCTION TYPE: ICD-10-CM

## 2023-06-12 PROCEDURE — 1036F TOBACCO NON-USER: CPT | Performed by: NURSE PRACTITIONER

## 2023-06-12 PROCEDURE — 3078F DIAST BP <80 MM HG: CPT | Performed by: NURSE PRACTITIONER

## 2023-06-12 PROCEDURE — 99214 OFFICE O/P EST MOD 30 MIN: CPT | Performed by: NURSE PRACTITIONER

## 2023-06-12 PROCEDURE — G8427 DOCREV CUR MEDS BY ELIG CLIN: HCPCS | Performed by: NURSE PRACTITIONER

## 2023-06-12 PROCEDURE — 3074F SYST BP LT 130 MM HG: CPT | Performed by: NURSE PRACTITIONER

## 2023-06-12 PROCEDURE — G8417 CALC BMI ABV UP PARAM F/U: HCPCS | Performed by: NURSE PRACTITIONER

## 2023-06-12 PROCEDURE — 3017F COLORECTAL CA SCREEN DOC REV: CPT | Performed by: NURSE PRACTITIONER

## 2023-06-12 RX ORDER — SILDENAFIL 100 MG/1
TABLET, FILM COATED ORAL
Qty: 30 TABLET | Refills: 5 | Status: SHIPPED | OUTPATIENT
Start: 2023-06-12

## 2023-06-12 RX ORDER — HYDROCHLOROTHIAZIDE 12.5 MG/1
12.5 CAPSULE, GELATIN COATED ORAL EVERY MORNING
Qty: 30 CAPSULE | Refills: 11 | Status: SHIPPED | OUTPATIENT
Start: 2023-06-12

## 2023-06-12 RX ORDER — OMEPRAZOLE 40 MG/1
CAPSULE, DELAYED RELEASE ORAL
Qty: 30 CAPSULE | Refills: 11 | Status: SHIPPED | OUTPATIENT
Start: 2023-06-12

## 2023-06-12 SDOH — ECONOMIC STABILITY: INCOME INSECURITY: HOW HARD IS IT FOR YOU TO PAY FOR THE VERY BASICS LIKE FOOD, HOUSING, MEDICAL CARE, AND HEATING?: NOT HARD AT ALL

## 2023-06-12 SDOH — ECONOMIC STABILITY: HOUSING INSECURITY
IN THE LAST 12 MONTHS, WAS THERE A TIME WHEN YOU DID NOT HAVE A STEADY PLACE TO SLEEP OR SLEPT IN A SHELTER (INCLUDING NOW)?: NO

## 2023-06-12 SDOH — ECONOMIC STABILITY: FOOD INSECURITY: WITHIN THE PAST 12 MONTHS, THE FOOD YOU BOUGHT JUST DIDN'T LAST AND YOU DIDN'T HAVE MONEY TO GET MORE.: NEVER TRUE

## 2023-06-12 SDOH — ECONOMIC STABILITY: FOOD INSECURITY: WITHIN THE PAST 12 MONTHS, YOU WORRIED THAT YOUR FOOD WOULD RUN OUT BEFORE YOU GOT MONEY TO BUY MORE.: NEVER TRUE

## 2023-06-12 ASSESSMENT — PATIENT HEALTH QUESTIONNAIRE - PHQ9
SUM OF ALL RESPONSES TO PHQ QUESTIONS 1-9: 0
2. FEELING DOWN, DEPRESSED OR HOPELESS: 0
1. LITTLE INTEREST OR PLEASURE IN DOING THINGS: 0
SUM OF ALL RESPONSES TO PHQ QUESTIONS 1-9: 0
SUM OF ALL RESPONSES TO PHQ9 QUESTIONS 1 & 2: 0

## 2023-06-19 ASSESSMENT — ENCOUNTER SYMPTOMS
SHORTNESS OF BREATH: 0
BLURRED VISION: 0
ORTHOPNEA: 0

## 2023-06-20 NOTE — PROGRESS NOTES
General: He is not in acute distress. Appearance: He is well-developed. HENT:      Head: Normocephalic and atraumatic. Right Ear: External ear normal.      Left Ear: External ear normal.      Nose: Nose normal.   Eyes:      Conjunctiva/sclera: Conjunctivae normal.      Pupils: Pupils are equal, round, and reactive to light. Neck:      Vascular: No JVD. Cardiovascular:      Rate and Rhythm: Normal rate and regular rhythm. Heart sounds: Normal heart sounds. No murmur heard. Pulmonary:      Effort: Pulmonary effort is normal. No respiratory distress. Breath sounds: Normal breath sounds. No wheezing or rales. Abdominal:      General: Bowel sounds are normal. There is no distension. Palpations: Abdomen is soft. There is no mass. Tenderness: There is no abdominal tenderness. Musculoskeletal:      Cervical back: Neck supple. Skin:     General: Skin is warm and dry. Neurological:      Mental Status: He is alert and oriented to person, place, and time. Assessment & Plan   Elinor Dubin was seen today for check-up, hypertension, hyperlipidemia and ed follow-up. Diagnoses and all orders for this visit:    Diverticulitis    Essential hypertension  -     hydroCHLOROthiazide (MICROZIDE) 12.5 MG capsule;  Take 1 capsule by mouth every morning    Gastroesophageal reflux disease, unspecified whether esophagitis present  -     omeprazole (PRILOSEC) 40 MG delayed release capsule; take 1 capsule by mouth once daily    Erectile dysfunction, unspecified erectile dysfunction type  -     sildenafil (VIAGRA) 100 MG tablet; TAKE 1/2 TO 1 TABLET BY MOUTH as needed for erectile dysfunction    Elevated PSA  -     Ivette Mojica MD, Urology, [facility] daily for recurrent diverticulitis  Orders Placed This Encounter   Procedures   Ivette Mojica MD, Urology, [facility]     Referral Priority:   Routine     Referral Type:   Eval and Treat     Referral Reason:   Specialty

## 2023-07-10 DIAGNOSIS — N52.9 ERECTILE DYSFUNCTION, UNSPECIFIED ERECTILE DYSFUNCTION TYPE: ICD-10-CM

## 2023-07-10 RX ORDER — SILDENAFIL 100 MG/1
TABLET, FILM COATED ORAL
Qty: 30 TABLET | Refills: 5 | OUTPATIENT
Start: 2023-07-10

## 2023-09-12 ENCOUNTER — OFFICE VISIT (OUTPATIENT)
Dept: FAMILY MEDICINE CLINIC | Age: 58
End: 2023-09-12
Payer: COMMERCIAL

## 2023-09-12 VITALS
WEIGHT: 292 LBS | HEIGHT: 69 IN | HEART RATE: 86 BPM | SYSTOLIC BLOOD PRESSURE: 138 MMHG | BODY MASS INDEX: 43.25 KG/M2 | DIASTOLIC BLOOD PRESSURE: 88 MMHG

## 2023-09-12 DIAGNOSIS — G47.33 OBSTRUCTIVE SLEEP APNEA: Primary | ICD-10-CM

## 2023-09-12 DIAGNOSIS — I10 ESSENTIAL HYPERTENSION: ICD-10-CM

## 2023-09-12 DIAGNOSIS — K21.9 GASTROESOPHAGEAL REFLUX DISEASE, UNSPECIFIED WHETHER ESOPHAGITIS PRESENT: ICD-10-CM

## 2023-09-12 PROCEDURE — 3074F SYST BP LT 130 MM HG: CPT | Performed by: NURSE PRACTITIONER

## 2023-09-12 PROCEDURE — 99214 OFFICE O/P EST MOD 30 MIN: CPT | Performed by: NURSE PRACTITIONER

## 2023-09-12 PROCEDURE — G8428 CUR MEDS NOT DOCUMENT: HCPCS | Performed by: NURSE PRACTITIONER

## 2023-09-12 PROCEDURE — 1036F TOBACCO NON-USER: CPT | Performed by: NURSE PRACTITIONER

## 2023-09-12 PROCEDURE — 3017F COLORECTAL CA SCREEN DOC REV: CPT | Performed by: NURSE PRACTITIONER

## 2023-09-12 PROCEDURE — 3078F DIAST BP <80 MM HG: CPT | Performed by: NURSE PRACTITIONER

## 2023-09-12 PROCEDURE — G8417 CALC BMI ABV UP PARAM F/U: HCPCS | Performed by: NURSE PRACTITIONER

## 2023-09-19 ASSESSMENT — ENCOUNTER SYMPTOMS
BLURRED VISION: 0
ORTHOPNEA: 0
SHORTNESS OF BREATH: 0

## 2023-09-25 ENCOUNTER — TELEPHONE (OUTPATIENT)
Dept: FAMILY MEDICINE CLINIC | Age: 58
End: 2023-09-25

## 2023-09-25 DIAGNOSIS — G47.33 OBSTRUCTIVE SLEEP APNEA: Primary | ICD-10-CM

## 2023-09-25 NOTE — TELEPHONE ENCOUNTER
Kena Rey states the sleep study titration that was ordered would need changed to a split study instead.

## 2023-10-09 ENCOUNTER — OFFICE VISIT (OUTPATIENT)
Dept: UROLOGY | Age: 58
End: 2023-10-09
Payer: COMMERCIAL

## 2023-10-09 VITALS
DIASTOLIC BLOOD PRESSURE: 100 MMHG | BODY MASS INDEX: 42.21 KG/M2 | SYSTOLIC BLOOD PRESSURE: 146 MMHG | HEART RATE: 90 BPM | WEIGHT: 285 LBS | HEIGHT: 69 IN

## 2023-10-09 DIAGNOSIS — N13.8 BPH WITH OBSTRUCTION/LOWER URINARY TRACT SYMPTOMS: ICD-10-CM

## 2023-10-09 DIAGNOSIS — R97.20 ELEVATED PSA: Primary | ICD-10-CM

## 2023-10-09 DIAGNOSIS — Q61.02 MULTIPLE RENAL CYSTS: ICD-10-CM

## 2023-10-09 DIAGNOSIS — N40.1 BPH WITH OBSTRUCTION/LOWER URINARY TRACT SYMPTOMS: ICD-10-CM

## 2023-10-09 PROCEDURE — 1036F TOBACCO NON-USER: CPT | Performed by: UROLOGY

## 2023-10-09 PROCEDURE — G8427 DOCREV CUR MEDS BY ELIG CLIN: HCPCS | Performed by: UROLOGY

## 2023-10-09 PROCEDURE — G8417 CALC BMI ABV UP PARAM F/U: HCPCS | Performed by: UROLOGY

## 2023-10-09 PROCEDURE — 99204 OFFICE O/P NEW MOD 45 MIN: CPT | Performed by: UROLOGY

## 2023-10-09 PROCEDURE — 3017F COLORECTAL CA SCREEN DOC REV: CPT | Performed by: UROLOGY

## 2023-10-09 PROCEDURE — G8484 FLU IMMUNIZE NO ADMIN: HCPCS | Performed by: UROLOGY

## 2023-10-09 ASSESSMENT — ENCOUNTER SYMPTOMS
DIARRHEA: 0
VOMITING: 0
NAUSEA: 0
SHORTNESS OF BREATH: 0
ABDOMINAL DISTENTION: 0
RESPIRATORY NEGATIVE: 1
ABDOMINAL PAIN: 0

## 2023-10-09 NOTE — PROGRESS NOTES
Subjective:      Patient ID: Van Ansari is a 62 y.o. male    HPI  This is a 63 yo male with ED on Viagra, HTN, HLD, GERD and sent for evaluation of an elevated PSA. He reports that he has NF 4-5 but DF < 8. He gets occasional urgency at night. He has a fair flow and is unsure if the bladder empties. He gets intermittency and occasional PVD. He has no hematuria or dysuria or pain. He is here with his wife. He has no prior UTI or stones. He has no abd or flank pain. He has no F/C. He is in the process of getting RAMESH evaluated which I explained may be contributing to his NF. He has prior renal cyst noted on CT and U/S was recommended. I reviewed the PSA result. Past Medical History:   Diagnosis Date    Diverticulitis     GERD (gastroesophageal reflux disease)     Hyperlipidemia     Hypertension      Past Surgical History:   Procedure Laterality Date    CHOLECYSTECTOMY      HERNIA REPAIR      rt groin     Social History     Socioeconomic History    Marital status:    Tobacco Use    Smoking status: Never    Smokeless tobacco: Never   Vaping Use    Vaping Use: Never used   Substance and Sexual Activity    Alcohol use: Not Currently     Comment: occas    Drug use: Yes     Types: Marijuana [place])     Comment: daily     Social Determinants of Health     Financial Resource Strain: Low Risk  (6/12/2023)    Overall Financial Resource Strain (CARDIA)     Difficulty of Paying Living Expenses: Not hard at all   Food Insecurity: No Food Insecurity (6/12/2023)    Hunger Vital Sign     Worried About Running Out of Food in the Last Year: Never true     Ran Out of Food in the Last Year: Never true   Transportation Needs: Unknown (6/12/2023)    PRAPARE - Transportation     Lack of Transportation (Non-Medical): No   Housing Stability: Unknown (6/12/2023)    Housing Stability Vital Sign     Unstable Housing in the Last Year: No     No family history on file.   Current Outpatient Medications   Medication Sig Dispense

## 2023-10-16 ENCOUNTER — HOSPITAL ENCOUNTER (OUTPATIENT)
Dept: ULTRASOUND IMAGING | Age: 58
Discharge: HOME | End: 2023-10-18
Attending: UROLOGY
Payer: COMMERCIAL

## 2023-10-16 DIAGNOSIS — Q61.02 MULTIPLE RENAL CYSTS: ICD-10-CM

## 2023-10-16 PROCEDURE — 76770 US EXAM ABDO BACK WALL COMP: CPT

## 2023-11-01 ENCOUNTER — OFFICE VISIT (OUTPATIENT)
Dept: UROLOGY | Age: 58
End: 2023-11-01

## 2023-11-01 VITALS
HEART RATE: 84 BPM | BODY MASS INDEX: 41.47 KG/M2 | DIASTOLIC BLOOD PRESSURE: 100 MMHG | WEIGHT: 280 LBS | SYSTOLIC BLOOD PRESSURE: 142 MMHG | HEIGHT: 69 IN

## 2023-11-01 DIAGNOSIS — N40.1 BPH WITH OBSTRUCTION/LOWER URINARY TRACT SYMPTOMS: Primary | ICD-10-CM

## 2023-11-01 DIAGNOSIS — N13.8 BPH WITH OBSTRUCTION/LOWER URINARY TRACT SYMPTOMS: Primary | ICD-10-CM

## 2023-11-01 DIAGNOSIS — R31.29 MICROSCOPIC HEMATURIA: ICD-10-CM

## 2023-11-01 LAB
BILIRUBIN, POC: ABNORMAL
BLOOD URINE, POC: ABNORMAL
CLARITY, POC: CLEAR
COLOR, POC: YELLOW
GLUCOSE URINE, POC: ABNORMAL
KETONES, POC: ABNORMAL
LEUKOCYTE EST, POC: ABNORMAL
NITRITE, POC: ABNORMAL
PH, POC: 7
PROTEIN, POC: ABNORMAL
SPECIFIC GRAVITY, POC: 1.02
UROBILINOGEN, POC: 0.2

## 2023-11-01 RX ORDER — CIPROFLOXACIN 500 MG/1
500 TABLET, FILM COATED ORAL 2 TIMES DAILY
Qty: 4 TABLET | Refills: 0 | Status: SHIPPED | OUTPATIENT
Start: 2023-11-01

## 2023-11-01 RX ORDER — TAMSULOSIN HYDROCHLORIDE 0.4 MG/1
0.4 CAPSULE ORAL DAILY
Qty: 90 CAPSULE | Refills: 3 | Status: SHIPPED | OUTPATIENT
Start: 2023-11-01

## 2023-11-01 ASSESSMENT — ENCOUNTER SYMPTOMS: ABDOMINAL PAIN: 0

## 2023-11-01 NOTE — PROGRESS NOTES
Subjective:      Patient ID: Juliann Kehr is a 62 y.o. male    HPI  This is a 63 yo male with ED on Viagra, HTN, HLD, GERD and back for evaluation of an elevated PSA and abnl JIM with BPH and LUTs. Since last seen on 10/9/23, he has no new complaints. He has not taken any antibiotics in last 6 mo. He still has bothersome LUTs and I reviewed the interval PSA and Renal U/S report showing bilateral simple renal cysts with stable small Lt renal stone. He is here with his wife. Past Medical History:   Diagnosis Date    Diverticulitis     GERD (gastroesophageal reflux disease)     Hyperlipidemia     Hypertension      Past Surgical History:   Procedure Laterality Date    CHOLECYSTECTOMY      HERNIA REPAIR      rt groin     Social History     Socioeconomic History    Marital status:    Tobacco Use    Smoking status: Never    Smokeless tobacco: Never   Vaping Use    Vaping Use: Never used   Substance and Sexual Activity    Alcohol use: Not Currently     Comment: occas    Drug use: Yes     Types: Marijuana [place])     Comment: daily     Social Determinants of Health     Financial Resource Strain: Low Risk  (6/12/2023)    Overall Financial Resource Strain (CARDIA)     Difficulty of Paying Living Expenses: Not hard at all   Food Insecurity: No Food Insecurity (6/12/2023)    Hunger Vital Sign     Worried About Running Out of Food in the Last Year: Never true     Ran Out of Food in the Last Year: Never true   Transportation Needs: Unknown (6/12/2023)    PRAPARE - Transportation     Lack of Transportation (Non-Medical): No   Housing Stability: Unknown (6/12/2023)    Housing Stability Vital Sign     Unstable Housing in the Last Year: No     No family history on file.   Current Outpatient Medications   Medication Sig Dispense Refill    hydroCHLOROthiazide (MICROZIDE) 12.5 MG capsule Take 1 capsule by mouth every morning 30 capsule 11    omeprazole (PRILOSEC) 40 MG delayed release capsule take 1 capsule by mouth once

## 2023-11-02 PROBLEM — R31.29 MICROSCOPIC HEMATURIA: Status: ACTIVE | Noted: 2023-11-02

## 2023-11-02 PROBLEM — N40.1 BENIGN LOCALIZED HYPERPLASIA OF PROSTATE WITH URINARY RETENTION: Status: ACTIVE | Noted: 2023-11-02

## 2023-11-02 PROBLEM — R33.8 BENIGN LOCALIZED HYPERPLASIA OF PROSTATE WITH URINARY RETENTION: Status: ACTIVE | Noted: 2023-11-02
